# Patient Record
Sex: FEMALE | Race: WHITE | NOT HISPANIC OR LATINO | Employment: FULL TIME | URBAN - METROPOLITAN AREA
[De-identification: names, ages, dates, MRNs, and addresses within clinical notes are randomized per-mention and may not be internally consistent; named-entity substitution may affect disease eponyms.]

---

## 2020-12-09 ENCOUNTER — OFFICE VISIT CONVERTED (OUTPATIENT)
Dept: FAMILY MEDICINE CLINIC | Facility: CLINIC | Age: 40
End: 2020-12-09
Attending: NURSE PRACTITIONER

## 2020-12-09 ENCOUNTER — CONVERSION ENCOUNTER (OUTPATIENT)
Dept: FAMILY MEDICINE CLINIC | Facility: CLINIC | Age: 40
End: 2020-12-09

## 2020-12-09 ENCOUNTER — HOSPITAL ENCOUNTER (OUTPATIENT)
Dept: FAMILY MEDICINE CLINIC | Facility: CLINIC | Age: 40
Discharge: HOME | End: 2020-12-09
Attending: NURSE PRACTITIONER

## 2020-12-22 ENCOUNTER — HOSPITAL ENCOUNTER (OUTPATIENT)
Dept: LAB | Facility: HOSPITAL | Age: 40
Discharge: HOME | End: 2020-12-22
Attending: NURSE PRACTITIONER

## 2020-12-22 ENCOUNTER — HOSPITAL ENCOUNTER (OUTPATIENT)
Dept: GENERAL RADIOLOGY | Facility: HOSPITAL | Age: 40
Discharge: HOME | End: 2020-12-22
Attending: NURSE PRACTITIONER

## 2020-12-22 ENCOUNTER — OFFICE VISIT CONVERTED (OUTPATIENT)
Dept: FAMILY MEDICINE CLINIC | Facility: CLINIC | Age: 40
End: 2020-12-22
Attending: NURSE PRACTITIONER

## 2020-12-22 LAB
ALBUMIN SERPL-MCNC: 4 G/DL (ref 3.5–5)
ALBUMIN/GLOB SERPL: 1.1 {RATIO} (ref 1.4–2.6)
ALP SERPL-CCNC: 132 U/L (ref 42–98)
ALT SERPL-CCNC: 15 U/L (ref 10–40)
ANION GAP SERPL CALC-SCNC: 14 MMOL/L (ref 8–19)
AST SERPL-CCNC: 24 U/L (ref 15–50)
BASOPHILS # BLD AUTO: 0.14 10*3/UL (ref 0–0.2)
BASOPHILS NFR BLD AUTO: 1.2 % (ref 0–3)
BILIRUB SERPL-MCNC: 0.24 MG/DL (ref 0.2–1.3)
BUN SERPL-MCNC: 6 MG/DL (ref 5–25)
BUN/CREAT SERPL: 8 {RATIO} (ref 6–20)
CALCIUM SERPL-MCNC: 8.9 MG/DL (ref 8.7–10.4)
CHLORIDE SERPL-SCNC: 105 MMOL/L (ref 99–111)
CHOLEST SERPL-MCNC: 143 MG/DL (ref 107–200)
CHOLEST/HDLC SERPL: 3.6 {RATIO} (ref 3–6)
CONV ABS IMM GRAN: 0.05 10*3/UL (ref 0–0.2)
CONV CO2: 23 MMOL/L (ref 22–32)
CONV IMMATURE GRAN: 0.4 % (ref 0–1.8)
CONV TOTAL PROTEIN: 7.5 G/DL (ref 6.3–8.2)
CREAT UR-MCNC: 0.72 MG/DL (ref 0.5–0.9)
DEPRECATED RDW RBC AUTO: 46.6 FL (ref 36.4–46.3)
EOSINOPHIL # BLD AUTO: 0.43 10*3/UL (ref 0–0.7)
EOSINOPHIL # BLD AUTO: 3.7 % (ref 0–7)
ERYTHROCYTE [DISTWIDTH] IN BLOOD BY AUTOMATED COUNT: 13.9 % (ref 11.7–14.4)
GFR SERPLBLD BASED ON 1.73 SQ M-ARVRAT: >60 ML/MIN/{1.73_M2}
GLOBULIN UR ELPH-MCNC: 3.5 G/DL (ref 2–3.5)
GLUCOSE SERPL-MCNC: 78 MG/DL (ref 65–99)
HCT VFR BLD AUTO: 36.1 % (ref 37–47)
HDLC SERPL-MCNC: 40 MG/DL (ref 40–60)
HGB BLD-MCNC: 11.4 G/DL (ref 12–16)
LDLC SERPL CALC-MCNC: 89 MG/DL (ref 70–100)
LYMPHOCYTES # BLD AUTO: 2.22 10*3/UL (ref 1–5)
LYMPHOCYTES NFR BLD AUTO: 18.8 % (ref 20–45)
MCH RBC QN AUTO: 28.9 PG (ref 27–31)
MCHC RBC AUTO-ENTMCNC: 31.6 G/DL (ref 33–37)
MCV RBC AUTO: 91.6 FL (ref 81–99)
MONOCYTES # BLD AUTO: 0.94 10*3/UL (ref 0.2–1.2)
MONOCYTES NFR BLD AUTO: 8 % (ref 3–10)
NEUTROPHILS # BLD AUTO: 8 10*3/UL (ref 2–8)
NEUTROPHILS NFR BLD AUTO: 67.9 % (ref 30–85)
NRBC CBCN: 0 % (ref 0–0.7)
OSMOLALITY SERPL CALC.SUM OF ELEC: 282 MOSM/KG (ref 273–304)
PLATELET # BLD AUTO: 378 10*3/UL (ref 130–400)
PMV BLD AUTO: 11.2 FL (ref 9.4–12.3)
POTASSIUM SERPL-SCNC: 3.5 MMOL/L (ref 3.5–5.3)
RBC # BLD AUTO: 3.94 10*6/UL (ref 4.2–5.4)
SODIUM SERPL-SCNC: 138 MMOL/L (ref 135–147)
TRIGL SERPL-MCNC: 69 MG/DL (ref 40–150)
TSH SERPL-ACNC: 2.25 M[IU]/L (ref 0.27–4.2)
VLDLC SERPL-MCNC: 14 MG/DL (ref 5–37)
WBC # BLD AUTO: 11.78 10*3/UL (ref 4.8–10.8)

## 2020-12-23 ENCOUNTER — HOSPITAL ENCOUNTER (OUTPATIENT)
Dept: FAMILY MEDICINE CLINIC | Facility: CLINIC | Age: 40
Discharge: HOME | End: 2020-12-23
Attending: NURSE PRACTITIONER

## 2020-12-28 LAB
CONV LAST MENSTURAL PERIOD: NORMAL
SPECIMEN SOURCE: NORMAL
SPECIMEN SOURCE: NORMAL
THIN PREP CVX: NORMAL

## 2021-01-13 LAB — FUNGUS CSF CULT: NORMAL

## 2021-01-19 ENCOUNTER — OFFICE VISIT CONVERTED (OUTPATIENT)
Dept: FAMILY MEDICINE CLINIC | Facility: CLINIC | Age: 41
End: 2021-01-19
Attending: NURSE PRACTITIONER

## 2021-01-20 ENCOUNTER — OFFICE VISIT CONVERTED (OUTPATIENT)
Dept: PODIATRY | Facility: CLINIC | Age: 41
End: 2021-01-20
Attending: PODIATRIST

## 2021-03-09 ENCOUNTER — OFFICE VISIT CONVERTED (OUTPATIENT)
Dept: PODIATRY | Facility: CLINIC | Age: 41
End: 2021-03-09
Attending: PODIATRIST

## 2021-03-09 ENCOUNTER — CONVERSION ENCOUNTER (OUTPATIENT)
Dept: PODIATRY | Facility: CLINIC | Age: 41
End: 2021-03-09

## 2021-03-24 ENCOUNTER — OFFICE VISIT CONVERTED (OUTPATIENT)
Dept: PODIATRY | Facility: CLINIC | Age: 41
End: 2021-03-24
Attending: PODIATRIST

## 2021-04-28 ENCOUNTER — HOSPITAL ENCOUNTER (OUTPATIENT)
Dept: PHYSICAL THERAPY | Facility: CLINIC | Age: 41
Setting detail: RECURRING SERIES
Discharge: HOME | End: 2021-05-19
Attending: NURSE PRACTITIONER

## 2021-05-10 NOTE — H&P
History and Physical      Patient Name: Sandra Dhillon   Patient ID: [id number]   Sex: Female   YOB: 1980    Primary Care Provider: Veronica VALENTINE   Referring Provider: Veronica VALENTINE    Visit Date: January 20, 2021    Provider: Omar Cardoso DPM   Location: [facility]   Location Address: [address] [place] [address]  [id number]   Location Phone: [phone]          Chief Complaint  · Right Foot Pain  · MTPJ      History Of Present Illness  Sandra Dhillon is a 40 year old /White female who presents to the [facility] today new patient referred from Veronica VALENTINE.      New, Established, New Problem:  new  Location:  Right 1st metatarsal phalangeal joint  Duration:  one month  Onset:  insidious, started after she started working at [employer]  Nature:  sore, achy, numbness  Stable, worsening, improving: improving   Aggravating factors:   Patient relates pain is aggravated by shoe gear and ambulation.     Previous Treatment:  PO steroids, NSAIDs.    Patient denies any fevers, chills, nausea, vomiting, shortness of breathe, nor any other constitutional signs nor symptoms.             Past Medical History  Anxiety; Arthritis; Cervical cancer screening; Depression; Foot pain, right; Forgetfulness; Heel pain; Hemorrhoids; Hidradenitis suppurativa; Ingrown toenail; Migraines; Numbness in feet; Screening for breast cancer         Past Surgical History  Arm Surgery; Excision, sweat glands, inguinal region         Medication List  sertraline 50 mg oral tablet; Topamax 25 mg oral tablet         Allergy List  NO KNOWN DRUG ALLERGIES       Allergies Reconciled  Family Medical History  Cancer, Unspecified; FH: diabetes mellitus         Social History  Alcohol (Never); Tobacco (Current every day)         Immunizations  Name Date Admin   Influenza 12/09/2020         Review of Systems  · Constitutional  o Denies  o : fatigue, night  "sweats  · Eyes  o Denies  o : double vision, blurred vision  · HENT  o Denies  o : vertigo, recent head injury  · Cardiovascular  o Denies  o : chest pain, irregular heart beats  · Respiratory  o Denies  o : shortness of breath, productive cough  · Gastrointestinal  o Denies  o : nausea, vomiting  · Genitourinary  o Denies  o : dysuria, urinary retention  · Integument  o Denies  o : hair growth change, new skin lesions  · Neurologic  o Denies  o : altered mental status, seizures  · Musculoskeletal  o * See HPI  · Endocrine  o Denies  o : cold intolerance, heat intolerance  · Heme-Lymph  o Denies  o : petechiae, lymph node enlargement or tenderness  · Allergic-Immunologic  o Denies  o : frequent illnesses      Vitals  Date Time BP Position Site L\R Cuff Size HR RR TEMP (F) WT  HT  BMI kg/m2 BSA m2 O2 Sat FR L/min FiO2 HC       01/20/2021 08:59 /63 Sitting    70 - R  97.3 194lbs 16oz 5'  3\" 34.54 1.98 100 %            Physical Examination  · Constitutional  o Appearance  o : well developed, well-nourished, no obvious deformities present  · Cardiovascular  o Peripheral Vascular System  o :   § Pedal Pulses  § : pulses 2 + and symmetrical  § Extremities  § : no edema in lower extremities  · Musculoskeletal  o General  o :   § General Musculoskeletal  § : Lower extremity muscle and strength and range of motion is equal and symmetrical bilaterally. The knees are noted to be normal in alignment. Right Medial deviation of the first metatarsal with associated lateral deviation of the hallux at the metatarsal phalangeal joint. Positive tenderness to palpation. No signs of edema, erythema, lymphangitis, nor signs of infection.   · Skin and Subcutaneous Tissue  o General Inspection  o : Skin is noted to have normal texture and turgor, with no excrescences noted.   o Digits and Nails  o : The toenails are noted to be without disese.  · Neurologic  o Sensation  o : Epicritic sensations intact bilaterally.     Dr. Cardoso " reviewed radiographs and results from [facility] and discussed them with the patient.  These are significant for Right Medial deviation of the first metatarsal with associated lateral deviation of the hallux at the metatarsal phalangeal joint with mild Degenerative joint disease.  No periosteal reactions nor osteolytic changes seen.  No occult fractures seen.           Assessment  · Foot pain, right     729.5/M79.671  · Primary osteoarthritis, right ankle and foot     715.17/M19.071  · Hallux abducto valgus, right     735.0/M20.11      Plan  · Medications  o Voltaren 1 % topical gel   SIG: apply to affected area(s) by topical route 4 times a day for 30 days   DISP: (5) Tube with 3 refills  Prescribed on 01/20/2021     o Medications have been Reconciled  o Transition of Care or Provider Policy  · Instructions  o I have discussed the findings of this evaluation with the patient. The discussion included a complete verbal explanation of any changes in the examination results, diagnosis, and the current treatment plan. A schedule for future care needs was explained. If any questions should arise after returning home, I have encouraged the patient to feel free to contact Dr. Cardoso. The patient states understanding and agreement with this plan.  o Pt to monitor for problems and to contact Dr. Cardoso for follow-up should such signs occur. Patient states understanding and agreement with this plan.   o Overview: Osteoarthritis occurs when progressive damage to articular cartilage is caused by multiple factors that are comprised of joint integrity, genetics, local inflammation, mechanical forces, and cellular and biochemical processes. It is the most common joint disorder and leading cause of disability for patients over the age of 65. Males and females are equally affected and genetics play a role in up to 65% of cases. Osteoarthritis can be categorized as either inflammatory or noninflammatory based on  presentation. Patients typically complain of joint pain and stiffness. Joint tenderness, crepitus or joint effusion may be present. The objective of therapy is to control pain, decrease swelling, reduce disability and enhance quality of life.  o Pharmacological Treatment: This patient is having an acute flare of osteoarthritis and intra-articular injection of a corticosteroid has been administered. Patient is advised that intra-articular corticosteroid injections are not to exceed more than 3 per year.   o Follow Up PRN.  o Discussed proper shoegear for the patient's feet and medical condition.  o Rice Therapy: It is important to treat any injury as soon as possible to help control swelling and increase recovery time. The recognized regimen for immediate treatment of sport injuries includes rest, ice (cold application), compression, and elevation (RICE). Remove the injured athlete from play, apply ice to the affected area, wrap or compress the injured area with an elastic bandage when appropriate, and elevate the injured area above heart level to reduce swelling. The patient is to not use ice for longer than 20 minutes at a time, with at least 20 minutes of no ice usage between applications.   o Recommend OTC silicon padding.  o Electronically Identified Patient Education Materials Provided Electronically  · Disposition  o Call or Return if symptoms worsen or persist.            Electronically Signed by: Omar Cardoso DPM -Author on January 20, 2021 09:34:30 AM

## 2021-05-10 NOTE — H&P
History and Physical      Patient Name: Sandra Dhillon   Patient ID: [id number]   Sex: Female   YOB: 1980    Primary Care Provider: Veronica VALENTINE   Referring Provider: Veronica VALENTINE    Visit Date: December 9, 2020    Provider: MEME Child   Location: [facility]   Location Address: [address] [place] [address], [address]  [place], [place]  [id number]   Location Phone: [phone]          Chief Complaint  · new patient/establish care      History Of Present Illness  Sandra Dhillon is a 40 year old /White female who presents for evaluation and treatment of:      Patient is here today to establish care with a new provider. Patient's previous PCP was Dorothy VALENTINE.     Patient has some complaints of reoccuring migraines. sensitivity to light and sound.  Patient has previously taken medication for them, but is no longer on anything. was taking Topamax, worked well, stopped while pregnant then had no further migraines for a couple years, now they are back.  Patient states it has been going on for 6 months or longer. Patient doesn't currently take any medications.     Patient also has some complaints of a possible toe fungus in both feet, mostly in the first toe, present for a year or so.     Patient's last mammo was in 2019.    Patient's last pap was in 2017.     Patient has a hx of anxiety, depression, arthritis, and migraines.    Patient's PHQ9 score was 14 during today's visit. Patient denies any SI/HI. pt states has Hidradenitis suppurativa and has not been bale to get it under control, this contributes greatly to the depression. states at times is very painful and limits activities,. has been to derm in the past, Mayte Phipps, was on Humira but saw little improvement. has never tried Accutane, has also tried antibiotics and spironolactone with minimal improvement.     has never been on anything for depression, states thinks she would like to try  "something now.    pt reports hx spinal mennigitis in early 20's.       Past Medical History  Disease Name Date Onset Notes   Anxiety --  --    Arthritis --  --    Cervical cancer screening 2017 --    Depression --  --    Forgetfulness --  --    Hemorrhoids --  --    Hidradenitis suppurativa --  --    Migraines --  --    Screening for breast cancer 2019 --          Past Surgical History  Procedure Name Date Notes   Excision, sweat glands, inguinal region --  --        Allergies Reconciled  Family Medical History  Disease Name Relative/Age Notes   Cancer, Unspecified Grandfather (maternal)/  Mother/   thyroid Pancreatic         Social History  Finding Status Start/Stop Quantity Notes   Alcohol Never --/-- --  --    Tobacco Current every day --/-- 1 PPD --          Review of Systems  · Constitutional  o Admits  o : fatigue   · Eyes  o Denies  o : blurred vision, changes in vision  · HENT  o Admits  o : headaches   · Cardiovascular  o Denies  o : chest pain, irregular heart beats, rapid heart rate, dyspnea on exertion  · Respiratory  o Denies  o : shortness of breath, wheezing, cough  · Gastrointestinal  o Denies  o : nausea, vomiting, diarrhea, constipation, abdominal pain, blood in stools, melena  · Genitourinary  o Denies  o : frequency, dysuria, hematuria  · Integument  o Denies  o : rash, new skin lesions  · Musculoskeletal  o Denies  o : joint pain, joint swelling, muscle pain  · Endocrine  o Denies  o : polyuria, polydipsia      Vitals  Date Time BP Position Site L\R Cuff Size HR RR TEMP (F) WT  HT  BMI kg/m2 BSA m2 O2 Sat FR L/min FiO2 HC       12/09/2020 08:14 /85 Sitting    125 - R 18  198lbs 2oz 5'  3\" 35.1 2 98 %  21%           pulse re-check 88       Physical Examination  · Constitutional  o Appearance  o : well developed, well-nourished, no acute distress  · Head and Face  o Head  o : normocephalic, atraumatic  · Ears, Nose, Mouth and Throat  o Ears  o :   § External Ears  § : external auditory canal " appearance normal, no discharge present  § Otoscopic Examination  § : tympanic membranes pearly white/gray bilaterally  o Nose  o :   § External Nose  § : no lesions noted  § Nasopharynx  § : no discharge present  o Oral Cavity  o :   § Oral Mucosa  § : oral mucosa light pink  o Throat  o :   § Oropharynx  § : tonsils without exudate, no palatal petechiae  · Neck  o Inspection/Palpation  o : normal appearance, no masses or tenderness, trachea midline  o Thyroid  o : gland size normal, nontender, no nodules or masses present on palpation  · Respiratory  o Respiratory Effort  o : breathing unlabored  o Inspection of Chest  o : chest rise symmetric bilaterally  o Auscultation of Lungs  o : clear to auscultation bilaterally throughout inspiration and expiration  · Cardiovascular  o Heart  o :   § Auscultation of Heart  § : regular rate and rhythm, no murmurs, gallops or rubs  o Peripheral Vascular System  o :   § Extremities  § : no edema  · Lymphatic  o Neck  o : no cervical lymphadenopathy, no supraclavicular lymphadenopathy  · Psychiatric  o Mood and Affect  o : mood normal, affect appropriate     bilat great toenail slightly thicken with yellow/crusty appearance           Assessment  · Screening for depression     V79.0/Z13.89  · Need for influenza vaccination     V04.81/Z23  · Visit for screening mammogram     V76.12/Z12.31  · Depression     311/F32.9  · Fatigue     780.79/R53.83  · Migraines     346.90/G43.909  trial re-start Topamax   · Hidradenitis suppurativa     705.83/L73.2  axilla and groin   · Toenail fungus     110.1/B35.1  fungal nail culture sent for confirmation- if labs OK and culture confirms dx, will tx with Lamisil   · Establishing care with new doctor, encounter for     V65.8/Z76.89      Plan  · Orders  o Annual depression screening, 15 minutes (51276, ) - V79.0/Z13.89 - 12/09/2020  o Immunization Admin Fee (Single) ([facility]) (70472) - V04.81/Z23 - 12/09/2020  o Fluzone Quadrivalent Vaccine, age 6  months + (22347) - V04.81/Z23 - 12/09/2020   Vaccine - Influenza; Dose: 0.5; Site: Left Deltoid; Route: Intramuscular; Date: 12/09/2020 08:29:00; Exp: 06/30/2021; Lot: [id number]; Mfg: [employer]; TradeName: Fluzone Quadrivalent; Administered By: America Nash MA; Comment: Patient tolerated well  o Screening Mammography; Bilateral 3D (09645, 46803, ) - V76.12/Z12.31 - 12/09/2020  o Female Fatigue Panel (CBC, CMP, TSH, B12) [facility] (93384, 49211, 25012, 76708) - 780.79/R53.83 - 12/09/2020  o ACO-17: Screened for tobacco use AND received tobacco cessation intervention (4004F) - - 12/09/2020  o ACO-39: Current medications updated and reviewed (, 1159F) - - 12/09/2020  o ACO-18: Positive screen for clinical depression using a standardized tool and a follow-up plan documented () - - 12/09/2020  o ACO-14: Influenza immunization administered or previously received [facility] () - - 12/09/2020  o DERMATOLOGY CONSULTATION (DERMA) - 705.83/L73.2 - 12/09/2020   pt wanting to try Accutane   o Fungal Culture Nail [facility] (53368) - 110.1/B35.1 - 12/09/2020  · Medications  o Zoloft 25 mg oral tablet   SIG: take 1 tablet (25 mg) by oral route once daily   DISP: (30) Tablet with 1 refills  Prescribed on 12/09/2020     o Topamax 25 mg oral tablet   SIG: take 1 tab by oral route once daily at bedtime for 2 weeks, then increase to 2 tabs QD thereafter   DISP: (60) Tablet with 1 refills  Prescribed on 12/09/2020     o Medications have been Reconciled  o Transition of Care or Provider Policy  · Instructions  o Depression Screen completed and scanned into the EMR under the designated folder within the patient's documents.  o The provider screening met the required time of 15 minutes.  o Patient was given an SSRI/SSNRI medication and warned of possible side effects of the medication including potential for increased risk of suicidal thoughts and feelings. Patient was instructed that if they begin to exhibit any of these effects  "they will discontinue the medication immediately and contact our office or the ER ASAP.  o Patient agrees to a \"No Self Harm\" contract. Patient will either call us, 1, ER, [facility], [facility].  o Take all medications as prescribed/directed.  o Patient was educated/instructed on their diagnosis, treatment and medications prior to discharge from the clinic today.  o Patient instructed to seek medical attention urgently for new or worsening symptoms.  o Call the office with any concerns or questions.  o Chronic conditions reviewed and taken into consideration for today's treatment plan.  o Discussed Covid-19 precautions including, but not limited to, social distancing, avoid touching your face, and hand washing.      pt to sched PAP at her convenience, 6 week f/u on meds, SE and admin meds discussed             Electronically Signed by: MEME Child -Author on December 9, 2020 11:32:28 AM  "

## 2021-05-11 ENCOUNTER — OFFICE VISIT CONVERTED (OUTPATIENT)
Dept: FAMILY MEDICINE CLINIC | Facility: CLINIC | Age: 41
End: 2021-05-11
Attending: NURSE PRACTITIONER

## 2021-05-14 VITALS
OXYGEN SATURATION: 100 % | DIASTOLIC BLOOD PRESSURE: 68 MMHG | WEIGHT: 184 LBS | HEART RATE: 71 BPM | BODY MASS INDEX: 32.6 KG/M2 | SYSTOLIC BLOOD PRESSURE: 106 MMHG | HEIGHT: 63 IN | TEMPERATURE: 97.5 F

## 2021-05-14 VITALS
DIASTOLIC BLOOD PRESSURE: 82 MMHG | BODY MASS INDEX: 34.22 KG/M2 | RESPIRATION RATE: 18 BRPM | WEIGHT: 193.12 LBS | HEIGHT: 63 IN | SYSTOLIC BLOOD PRESSURE: 115 MMHG | HEART RATE: 88 BPM | OXYGEN SATURATION: 100 %

## 2021-05-14 VITALS
RESPIRATION RATE: 18 BRPM | SYSTOLIC BLOOD PRESSURE: 100 MMHG | WEIGHT: 198.12 LBS | HEIGHT: 63 IN | DIASTOLIC BLOOD PRESSURE: 85 MMHG | OXYGEN SATURATION: 98 % | BODY MASS INDEX: 35.11 KG/M2 | HEART RATE: 125 BPM

## 2021-05-14 VITALS
DIASTOLIC BLOOD PRESSURE: 63 MMHG | TEMPERATURE: 97.3 F | BODY MASS INDEX: 34.55 KG/M2 | HEART RATE: 70 BPM | WEIGHT: 195 LBS | HEIGHT: 63 IN | OXYGEN SATURATION: 100 % | SYSTOLIC BLOOD PRESSURE: 116 MMHG

## 2021-05-14 VITALS
BODY MASS INDEX: 31.71 KG/M2 | SYSTOLIC BLOOD PRESSURE: 102 MMHG | HEIGHT: 63 IN | HEART RATE: 75 BPM | OXYGEN SATURATION: 100 % | TEMPERATURE: 97.5 F | WEIGHT: 179 LBS | DIASTOLIC BLOOD PRESSURE: 71 MMHG

## 2021-05-14 VITALS
DIASTOLIC BLOOD PRESSURE: 58 MMHG | SYSTOLIC BLOOD PRESSURE: 110 MMHG | HEART RATE: 72 BPM | OXYGEN SATURATION: 99 % | WEIGHT: 196.25 LBS

## 2021-05-14 NOTE — PROGRESS NOTES
Progress Note      Patient Name: Sandra Dhillon   Patient ID: [id number]   Sex: Female   YOB: 1980    Primary Care Provider: Veronica VALENTINE   Referring Provider: Veronica VALENTINE    Visit Date: March 24, 2021    Provider: Omar Cardoso DPM   Location: [facility]   Location Address: [address] [place] [address]  [id number]   Location Phone: [phone]          Chief Complaint  · Right Foot Pain  · MTPJ      History Of Present Illness  Sandra Dhillon is a 40 year old /White female who presents to the [facility] today a follow up for:      New, Established, New Problem:  est  Location:  Right 1st metatarsal phalangeal joint  Duration:  one month  Onset:  insidious, started after she started working at [employer]  Nature:  sore, achy, numbness  Stable, worsening, improving: initiially improved but it worsening  Aggravating factors:   Patient relates pain is aggravated by shoe gear and ambulation.     Previous Treatment:  PO steroids, NSAIDs, Medrol colt    Patient denies any fevers, chills, nausea, vomiting, shortness of breathe, nor any other constitutional signs nor symptoms.    Patient relates no medical changes since their last visit.           Past Medical History  Anxiety; Arthritis; Cervical cancer screening; Depression; Foot pain, right; Forgetfulness; Heel pain; Hemorrhoids; Hidradenitis suppurativa; Ingrown toenail; Migraines; Numbness in feet; Screening for breast cancer         Past Surgical History  Arm Surgery; Excision, sweat glands, inguinal region         Medication List  diclofenac sodium 75 mg oral tablet,delayed release (DR/EC); sertraline 50 mg oral tablet; Topamax 25 mg oral tablet; Voltaren 1 % topical gel         Allergy List  NO KNOWN DRUG ALLERGIES       Allergies Reconciled  Family Medical History  Cancer, Unspecified; FH: diabetes mellitus         Social History  Alcohol (Never); Tobacco (Current every day)  "        Immunizations  Name Date Admin   Influenza 12/09/2020         Review of Systems  · Constitutional  o Denies  o : fatigue, night sweats  · Eyes  o Denies  o : double vision, blurred vision  · HENT  o Denies  o : vertigo, recent head injury  · Cardiovascular  o Denies  o : chest pain, irregular heart beats  · Respiratory  o Denies  o : shortness of breath, productive cough  · Gastrointestinal  o Denies  o : nausea, vomiting  · Genitourinary  o Denies  o : dysuria, urinary retention  · Integument  o Denies  o : hair growth change, new skin lesions  · Neurologic  o Denies  o : altered mental status, seizures  · Musculoskeletal  o * See HPI  · Endocrine  o Denies  o : cold intolerance, heat intolerance  · Heme-Lymph  o Denies  o : petechiae, lymph node enlargement or tenderness  · Allergic-Immunologic  o Denies  o : frequent illnesses      Vitals  Date Time BP Position Site L\R Cuff Size HR RR TEMP (F) WT  HT  BMI kg/m2 BSA m2 O2 Sat FR L/min FiO2 HC       03/24/2021 09:58 /71 Sitting    75 - R  97.5 179lbs 0oz 5'  3\" 31.71 1.9 100 %            Physical Examination  · Constitutional  o Appearance  o : well developed, well-nourished, no obvious deformities present  · Cardiovascular  o Peripheral Vascular System  o :   § Pedal Pulses  § : pulses 2 + and symmetrical  § Extremities  § : no edema in lower extremities  · Musculoskeletal  o General  o :   § General Musculoskeletal  § : Lower extremity muscle and strength and range of motion is equal and symmetrical bilaterally. The knees are noted to be normal in alignment. Right Medial deviation of the first metatarsal with associated lateral deviation of the hallux at the metatarsal phalangeal joint. Positive tenderness to palpation. No signs of edema, erythema, lymphangitis, nor signs of infection.   · Skin and Subcutaneous Tissue  o General Inspection  o : Skin is noted to have normal texture and turgor, with no excrescences noted.   o Digits and Nails  o : " The toenails are noted to be without disese.  · Neurologic  o Sensation  o : Epicritic sensations intact bilaterally.  · Injection Note/Aspiration Note  o Site  o : Right 1st metatarsal phalangeal joint  o Procedure  o : Procedure: After educating the patient, patient gave consent for the procedure. After using betadine prep x 3, injection was given. The patient tolerated the procedure well.   o Medication  o : 1.0ml of 1% lidocaine plain and 1.0ml of 4mg/ml Dexamethasone   o Disposition  o : The patient tolerated the procedure well          Assessment  · Foot pain, right     729.5/M79.671  · Primary osteoarthritis, right ankle and foot     715.17/M19.071  · Hallux abducto valgus, right     735.0/M20.11      Plan  · Orders  o Decadron 4 mg/1cc Injection () - 729.5/M79.671, 715.17/M19.071, 735.0/M20.11 - 03/24/2021   Injection - Dexamethasone 4mg/mL; Dose: 2 mg; Site: Not Entered; Route: intra-articular; Date: 03/24/2021 10:26:47; Exp: 11/30/2022; Lot: [id number]; Mfg: [employer]; TradeName: dexamethasone sodium phosphate; [facility]; Administered By: Omar Cardoso DPM; Comment: [id number] Injetion site right great toe  o Injection Intermid Joint (06902) - 729.5/M79.671, 715.17/M19.071, 735.0/M20.11 - 03/24/2021  · Medications  o Medications have been Reconciled  o Transition of Care or Provider Policy  · Instructions  o I have discussed the findings of this evaluation with the patient. The discussion included a complete verbal explanation of any changes in the examination results, diagnosis, and the current treatment plan. A schedule for future care needs was explained. If any questions should arise after returning home, I have encouraged the patient to feel free to contact Dr. Cardoso. The patient states understanding and agreement with this plan.  o Pt to monitor for problems and to contact Dr. Cradoso for follow-up should such signs occur. Patient states understanding and agreement  with this plan.   o Overview: Osteoarthritis occurs when progressive damage to articular cartilage is caused by multiple factors that are comprised of joint integrity, genetics, local inflammation, mechanical forces, and cellular and biochemical processes. It is the most common joint disorder and leading cause of disability for patients over the age of 65. Males and females are equally affected and genetics play a role in up to 65% of cases. Osteoarthritis can be categorized as either inflammatory or noninflammatory based on presentation. Patients typically complain of joint pain and stiffness. Joint tenderness, crepitus or joint effusion may be present. The objective of therapy is to control pain, decrease swelling, reduce disability and enhance quality of life.  o Pharmacological Treatment: This patient is having an acute flare of osteoarthritis and intra-articular injection of a corticosteroid has been administered. Patient is advised that intra-articular corticosteroid injections are not to exceed more than 3 per year.   o Discussed proper shoegear for the patient's feet and medical condition.  o Rice Therapy: It is important to treat any injury as soon as possible to help control swelling and increase recovery time. The recognized regimen for immediate treatment of sport injuries includes rest, ice (cold application), compression, and elevation (RICE). Remove the injured athlete from play, apply ice to the affected area, wrap or compress the injured area with an elastic bandage when appropriate, and elevate the injured area above heart level to reduce swelling. The patient is to not use ice for longer than 20 minutes at a time, with at least 20 minutes of no ice usage between applications.   o Patient request PRN follow-up.  o Electronically Identified Patient Education Materials Provided Electronically  · Disposition  o Call or Return if symptoms worsen or persist.            Electronically Signed by: Omar  JESSICA Cardoso -Author on March 24, 2021 02:19:27 PM

## 2021-05-14 NOTE — PROGRESS NOTES
Progress Note      Patient Name: Sandra Dhillon   Patient ID: [id number]   Sex: Female   YOB: 1980    Primary Care Provider: Veronica VALENTINE   Referring Provider: Veronica VALENTINE    Visit Date: December 22, 2020    Provider: MEME Child   Location: [facility]   Location Address: [address] [place] [address], [address]  [place], [place]  [id number]   Location Phone: [phone]          Chief Complaint  · CPE/WWE      History Of Present Illness  Sandra Dhillon is a 40 year old /White female who presents for evaluation and treatment of:      Patient is here today for an annual papsmear. Patient wants to discuss foot pain, right foot worse than left- new since starting work at [employer], states at times right foot actually gets numb feeling. states in tears sometimes by end of day. states is wearing recommended supportive shoes. also c/o LBP with radiation in to right hip. on and of for a while.     Patient has a hx of migraines, depression, and Hidradenitis. Patient is taking Topamax and Zoloft.       Past Medical History  Disease Name Date Onset Notes   Anxiety --  --    Arthritis --  --    Cervical cancer screening 2017 --    Depression --  --    Forgetfulness --  --    Hemorrhoids --  --    Hidradenitis suppurativa --  --    Migraines --  --    Screening for breast cancer 2019 --          Past Surgical History  Procedure Name Date Notes   Excision, sweat glands, inguinal region --  --          Medication List  Name Date Started Instructions   Topamax 25 mg oral tablet 12/09/2020 take 1 tab by oral route once daily at bedtime for 2 weeks, then increase to 2 tabs QD thereafter   Zoloft 25 mg oral tablet 12/09/2020 take 1 tablet (25 mg) by oral route once daily         Family Medical History  Disease Name Relative/Age Notes   Cancer, Unspecified Grandfather (maternal)/  Mother/   thyroid Pancreatic         Social History  Finding Status Start/Stop Quantity Notes  "  Alcohol Never --/-- --  --    Tobacco Current every day --/-- 1 PPD --          Immunizations  NameDate Admin Mfg Trade Name Lot Number Route Inj VIS Given VIS Publication   Qesbrwozn96/09/2020 [facility] Fluzone Quadrivalent [id number] IM LD 12/09/2020 08/15/2019   Comments: Patient tolerated well         Review of Systems  · Constitutional  o Denies  o : fatigue  · Eyes  o Denies  o : blurred vision, changes in vision  · HENT  o Denies  o : headaches  · Cardiovascular  o Denies  o : chest pain, irregular heart beats, rapid heart rate, dyspnea on exertion  · Respiratory  o Denies  o : shortness of breath, wheezing, cough  · Gastrointestinal  o Denies  o : nausea, vomiting, diarrhea, constipation, abdominal pain, blood in stools, melena  · Genitourinary  o Denies  o : frequency, dysuria, hematuria  · Integument  o Denies  o : rash, new skin lesions  · Musculoskeletal  o Denies  o : joint pain, joint swelling, muscle pain  · Endocrine  o Admits  o : central obesity  o Denies  o : polyuria, polydipsia      Vitals  Date Time BP Position Site L\R Cuff Size HR RR TEMP (F) WT  HT  BMI kg/m2 BSA m2 O2 Sat FR L/min FiO2 HC       12/09/2020 08:14 /85 Sitting    125 - R 18  198lbs 2oz 5'  3\" 35.1 2 98 %  21%    12/22/2020 08:28 /82 Sitting    88 - R 18  193lbs 2oz 5'  3\" 34.21 1.97 100 %  21%          Physical Examination  · Constitutional  o Appearance  o : well-nourished, in no acute distress  · Head and Face  o Head  o : normocephalic, atraumatic  · Ears, Nose, Mouth and Throat  o Ears  o :   § External Ears  § : external auditory canal appearance normal, no discharge present  § Otoscopic Examination  § : tympanic membranes pearly white/gray bilaterally  o Nose  o :   § External Nose  § : no lesions noted  § Nasopharynx  § : no discharge present  o Oral Cavity  o :   § Oral Mucosa  § : oral mucosa light pink  o Throat  o :   § Oropharynx  § : tonsils without exudate, no palatal " petechiae  · Neck  o Inspection/Palpation  o : normal appearance, no masses or tenderness, trachea midline  o Range of Motion  o : cervical range of motion within normal limits  o Thyroid  o : gland size normal, nontender, no nodules or masses present on palpation  · Respiratory  o Respiratory Effort  o : breathing unlabored  o Inspection of Chest  o : normal appearance  o Auscultation of Lungs  o : normal breath sounds throughout  · Cardiovascular  o Heart  o :   § Auscultation of Heart  § : regular rate and rhythm, no murmurs, gallops or rubs  o Peripheral Vascular System  o :   § Carotid Arteries  § : normal pulses bilaterally, no bruits present  § Pedal Pulses  § : pulses 2 bilaterally  § Extremities  § : no edema  · Breasts  o Inspection of Breasts  o : breasts symmetrical, no deformities present, no nipple discharge present- left breast with scarring and erythematous nodules with purulent drainage lateral aspect of breast - pt has extensive HS- states lesions are chronic- denies any changes   o Palpation of Breasts, Axillae  o : no masses present on palpation, no breast tenderness  · Gastrointestinal  o Abdominal Examination  o : abdomen nontender to palpation, tone normal without rigidity or guarding, no masses present, normal bowel sounds  · Genitourinary  o External Genitalia  o : pt with extensive HS   o Vagina  o : normal vaginal vault, no discharge present, no inflammatory lesions present, no masses present  o Urethra  o :   § Urethral Meatus  § : no inflammation present  § Urethral Body  § : urethra palpation normal  o Bladder  o : nontender to palpation  o Cervix  o : cervical Nabothian cysts noted, large polyp noted protruding from cervical os   o Uterus  o : nontender to palpation, no masses present, position midline/midplane  o Adnexa  o : no tenderness or masses present on bimanual examination  o Anus  o : no inflammation or lesions present  o Perineum  o : perineum within normal  limits  · Lymphatic  o Neck  o : no lymphadenopathy present  o Axilla  o : no lymphadenopathy present- extensive scarring from HS and surgical repair   o Groin  o : no lymphadenopathy present  · Neurologic  o Mental Status Examination  o :   § Orientation  § : grossly oriented to person, place and time  o Gait and Station  o : normal gait, able to stand without difficulty  · Psychiatric  o Judgement and Insight  o : judgment and insight intact  o Mood and Affect  o : mood normal, affect appropriate          Assessment  · Screening for cervical cancer     V76.2/Z12.4  · Visit for screening mammogram     V76.12/Z12.31  pt already has mammo sched for April 20th 2021   · Annual physical exam     V70.0/Z00.00  · Lumbago     724.2/M54.5  · Pap Smear     V76.2/Z01.419  · Right foot pain     729.5/M79.671  · Cervical polyp     622.7/N84.1    Problems Reconciled  Plan  · Orders  o Physical, Primary Care Panel (CBC, CMP, Lipid, TSH) [facility] (33309, 27603, 75562, 55441) - V70.0/Z00.00 - 12/22/2020  o Lumbar Spine Complete [facility] (54135) - 724.2/M54.5 - 12/22/2020  o Pap smear (24180) - V76.2/Z01.419 - 12/22/2020  o ACO-39: Current medications updated and reviewed (, 1159F) - - 12/22/2020  o ACO-14: Influenza immunization administered or previously received [facility] () - - 12/22/2020  o Xray foot right [facility] ([id number]) - 729.5/M79.671 - 12/22/2020  o OB/GYN CONSULTATION (OBGYN) - 622.7/N84.1 - 12/22/2020   large cervical polyp   · Medications  o diclofenac sodium 75 mg oral tablet,delayed release (DR/EC)   SIG: take 1 tablet (75 mg) by oral route 2 times per day   DISP: (60) Tablet with 0 refills  Prescribed on 12/22/2020     o prednisone 20 mg oral tablet   SIG: 3 tab PO QD for 3 days, then 2 tab PO QD for 2 days, then 1 tab PO QD for 2 days.   DISP: (15) Tablet with 0 refills  Prescribed on 12/22/2020     · Instructions  o Reviewed health maintenance flowsheet and updated information. Orders were placed and/or  patient's response was documented.  o **Pap Test/Liquid Based:   o Thin Prep  o Source:   o Cervix  o **Perform Reflex Human Papilloma Virus (HPV) High Risk on this Pap (If atypical squamous cells of the undetermined signifigcance (ASCUS)/Atypical Glandular Cells of undetermined significance (AGCUS): Low Grade Squamous Intraepitheal lesion (LGSIL): **  o Yes  o Medicare:  o No  o **Is this an annual PAP:  o Yes  o Take all medications as prescribed/directed.  o Patient was educated/instructed on their diagnosis, treatment and medications prior to discharge from the clinic today.  o Patient instructed to seek medical attention urgently for new or worsening symptoms.  o Call the office with any concerns or questions.  o Chronic conditions reviewed and taken into consideration for today's treatment plan.  o Discussed Covid-19 precautions including, but not limited to, social distancing, avoid touching your face, and hand washing.   · Disposition  o Call or Return if symptoms worsen or persist.  o Follow Up PRN            Electronically Signed by: MEME Child -Author on December 22, 2020 10:06:32 AM

## 2021-05-14 NOTE — PROGRESS NOTES
Progress Note      Patient Name: Sandra Dhillon   Patient ID: [id number]   Sex: Female   YOB: 1980    Primary Care Provider: Veronica VALENTINE   Referring Provider: Veronica VALENTINE    Visit Date: January 19, 2021    Provider: MEME Child   Location: [facility]   Location Address: [address] [place] [address], [address]  [place], [place]  [id number]   Location Phone: [phone]          Chief Complaint  · 6 Week Follow-Up      History Of Present Illness  Sandra Dhillon is a 40 year old /White female who presents for evaluation and treatment of:      Patient is here today regarding a follow up on her medications she was started on.  Patient is taking Zoloft 25 mg (1 Tab PO QD)  and Topamax 25 mg (2 Tab PO QD).    Patient denies any side effects from the Zoloft.     Patient does have a side effect of tingling of her hands and feet from the Topamax, states is tolerable and still wants to continue.     Patient has an appointment with the Podiatrist tomorrow. Patient seeing Physical Therapy for her back and hip tomorrow. Patient sees the OBGYN on February the 10th, 2021.    has not had any migraines since starting Topamax       Past Medical History  Disease Name Date Onset Notes   Anxiety --  --    Arthritis --  --    Cervical cancer screening 2017 --    Depression --  --    Forgetfulness --  --    Hemorrhoids --  --    Hidradenitis suppurativa --  --    Migraines --  --    Screening for breast cancer 2019 --          Past Surgical History  Procedure Name Date Notes   Excision, sweat glands, inguinal region --  --          Medication List  Name Date Started Instructions   diclofenac sodium 75 mg oral tablet,delayed release (DR/EC) 12/22/2020 take 1 tablet (75 mg) by oral route 2 times per day   Topamax 25 mg oral tablet 01/19/2021 take 1 tab by oral route once daily at bedtime for 2 weeks, then increase to 2 tabs QD thereafter         Allergy List  Allergen Name Date  Reaction Notes   NO KNOWN DRUG ALLERGIES --  --  --        Allergies Reconciled  Family Medical History  Disease Name Relative/Age Notes   Cancer, Unspecified Grandfather (maternal)/  Mother/   thyroid Pancreatic         Social History  Finding Status Start/Stop Quantity Notes   Alcohol Never --/-- --  --    Tobacco Current every day --/-- 1 PPD --          Immunizations  NameDate Admin Mfg Trade Name Lot Number Route Inj VIS Given VIS Publication   Soukmgzlk08/09/2020 [facility] Fluzone Quadrivalent [id number] IM LD 12/09/2020 08/15/2019   Comments: Patient tolerated well         Review of Systems  · Constitutional  o Denies  o : fever, fatigue, weight loss, weight gain  · Cardiovascular  o Denies  o : lower extremity edema, claudication, chest pressure, palpitations  · Respiratory  o Denies  o : shortness of breath, wheezing, cough, hemoptysis, dyspnea on exertion  · Gastrointestinal  o Denies  o : nausea, vomiting, diarrhea, constipation, abdominal pain      Vitals  Date Time BP Position Site L\R Cuff Size HR RR TEMP (F) WT  HT  BMI kg/m2 BSA m2 O2 Sat FR L/min FiO2        01/19/2021 09:55 /58 Sitting    72 - R   196lbs 4oz    99 %  21%          Physical Examination  · Constitutional  o Appearance  o : well developed, well-nourished, no acute distress  · Head and Face  o Head  o : normocephalic, atraumatic  · Neck  o Inspection/Palpation  o : normal appearance, no masses or tenderness, trachea midline  o Thyroid  o : gland size normal, nontender, no nodules or masses present on palpation  · Respiratory  o Respiratory Effort  o : breathing unlabored  o Inspection of Chest  o : chest rise symmetric bilaterally  o Auscultation of Lungs  o : clear to auscultation bilaterally throughout inspiration and expiration  · Cardiovascular  o Heart  o :   § Auscultation of Heart  § : regular rate and rhythm, no murmurs, gallops or rubs  o Peripheral Vascular System  o :   § Extremities  § : no edema  · Lymphatic  o Neck  o : no  cervical lymphadenopathy, no supraclavicular lymphadenopathy  · Psychiatric  o Mood and Affect  o : mood normal, affect appropriate          Assessment  · Depression     296.31  · Anxiety     300.02/F41.1  · Migraines     346.90/G43.909      Plan  · Orders  o ACO-39: Current medications updated and reviewed (, 1159F) - - 01/19/2021  · Medications  o sertraline 50 mg oral tablet   SIG: take 1 tablet (50 mg) by oral route once daily   DISP: (30) Tablet with 2 refills  Prescribed on 01/19/2021     o Topamax 25 mg oral tablet   SIG: take 1 tab by oral route once daily at bedtime for 2 weeks, then increase to 2 tabs QD thereafter   DISP: (60) Tablet with 2 refills  Refilled on 01/19/2021     o SERTRALINE 25MG TABLETS   SIG: TAKE 1 TABLET BY MOUTH EVERY DAY   DISP: (30) Tablet with 4 refills  Discontinued on 01/19/2021     o Medications have been Reconciled  o Transition of Care or Provider Policy  · Instructions  o Patient is taking medications as prescribed and doing well.   o Take all medications as prescribed/directed.  o Patient was educated/instructed on their diagnosis, treatment and medications prior to discharge from the clinic today.  o Patient instructed to seek medical attention urgently for new or worsening symptoms.  o Call the office with any concerns or questions.  o Chronic conditions reviewed and taken into consideration for today's treatment plan.  o Discussed Covid-19 precautions including, but not limited to, social distancing, avoid touching your face, and hand washing.   o Electronically Identified Patient Education Materials Provided Electronically  · Disposition  o Call or Return if symptoms worsen or persist.  o Follow Up PRN  o Follow Up in 3 months            Electronically Signed by: MEME Child -Author on January 19, 2021 10:31:05 AM

## 2021-05-14 NOTE — PROGRESS NOTES
Progress Note      Patient Name: Sandra Dhillon   Patient ID: [id number]   Sex: Female   YOB: 1980    Primary Care Provider: Veronica VALENTINE   Referring Provider: Veronica VALENTINE    Visit Date: March 9, 2021    Provider: Omar Cardoso DPM   Location: [facility]   Location Address: [address] [place] [address]  [id number]   Location Phone: [phone]          Chief Complaint  · Right Foot Pain  · MTPJ      History Of Present Illness  Sandra Dhillon is a 40 year old /White female who presents to the [facility] today a follow up for:      New, Established, New Problem:  est  Location:  Right 1st metatarsal phalangeal joint  Duration:  one month  Onset:  insidious, started after she started working at [employer]  Nature:  sore, achy, numbness  Stable, worsening, improving: worsening  Aggravating factors:   Patient relates pain is aggravated by shoe gear and ambulation.     Previous Treatment:  PO steroids, NSAIDs.    Patient denies any fevers, chills, nausea, vomiting, shortness of breathe, nor any other constitutional signs nor symptoms.    Patient relates no medical changes since their last visit.           Past Medical History  Anxiety; Arthritis; Cervical cancer screening; Depression; Foot pain, right; Forgetfulness; Heel pain; Hemorrhoids; Hidradenitis suppurativa; Ingrown toenail; Migraines; Numbness in feet; Screening for breast cancer         Past Surgical History  Arm Surgery; Excision, sweat glands, inguinal region         Medication List  sertraline 50 mg oral tablet; Topamax 25 mg oral tablet; Voltaren 1 % topical gel         Allergy List  NO KNOWN DRUG ALLERGIES         Family Medical History  Cancer, Unspecified; FH: diabetes mellitus         Social History  Alcohol (Never); Tobacco (Current every day)         Immunizations  Name Date Admin   Influenza 12/09/2020         Review of Systems  · Constitutional  o Denies  o : fatigue,  "night sweats  · Eyes  o Denies  o : double vision, blurred vision  · HENT  o Denies  o : vertigo, recent head injury  · Cardiovascular  o Denies  o : chest pain, irregular heart beats  · Respiratory  o Denies  o : shortness of breath, productive cough  · Gastrointestinal  o Denies  o : nausea, vomiting  · Genitourinary  o Denies  o : dysuria, urinary retention  · Integument  o Denies  o : hair growth change, new skin lesions  · Neurologic  o Denies  o : altered mental status, seizures  · Musculoskeletal  o * See HPI  · Endocrine  o Denies  o : cold intolerance, heat intolerance  · Heme-Lymph  o Denies  o : petechiae, lymph node enlargement or tenderness  · Allergic-Immunologic  o Denies  o : frequent illnesses      Vitals  Date Time BP Position Site L\R Cuff Size HR RR TEMP (F) WT  HT  BMI kg/m2 BSA m2 O2 Sat FR L/min FiO2 HC       03/09/2021 11:06 /68 Sitting    71 - R  97.5 184lbs 0oz 5'  3\" 32.59 1.93 100 %            Physical Examination  · Constitutional  o Appearance  o : well developed, well-nourished, no obvious deformities present  · Cardiovascular  o Peripheral Vascular System  o :   § Pedal Pulses  § : pulses 2 + and symmetrical  § Extremities  § : no edema in lower extremities  · Musculoskeletal  o General  o :   § General Musculoskeletal  § : Lower extremity muscle and strength and range of motion is equal and symmetrical bilaterally. The knees are noted to be normal in alignment. Right Medial deviation of the first metatarsal with associated lateral deviation of the hallux at the metatarsal phalangeal joint. Positive tenderness to palpation. No signs of edema, erythema, lymphangitis, nor signs of infection.   · Skin and Subcutaneous Tissue  o General Inspection  o : Skin is noted to have normal texture and turgor, with no excrescences noted.   o Digits and Nails  o : The toenails are noted to be without disese.  · Neurologic  o Sensation  o : Epicritic sensations intact " bilaterally.          Assessment  · Foot pain, right     729.5/M79.671  · Primary osteoarthritis, right ankle and foot     715.17/M19.071  · Hallux abducto valgus, right     735.0/M20.11      Plan  · Medications  o Medrol (Daniel) 4 mg oral tablets,dose pack   SIG: take as directed for 6 days   DISP: (1) Package with 0 refills  Prescribed on 03/09/2021     o diclofenac sodium 75 mg oral tablet,delayed release (DR/EC)   SIG: take 1 tablet (75 mg) by oral route 2 times per day for 30 days   DISP: (60) Tablet with 1 refills  Prescribed on 03/09/2021     o Medications have been Reconciled  o Transition of Care or Provider Policy  · Instructions  o I have discussed the findings of this evaluation with the patient. The discussion included a complete verbal explanation of any changes in the examination results, diagnosis, and the current treatment plan. A schedule for future care needs was explained. If any questions should arise after returning home, I have encouraged the patient to feel free to contact Dr. Cardoso. The patient states understanding and agreement with this plan.  o Pt to monitor for problems and to contact Dr. Cardoso for follow-up should such signs occur. Patient states understanding and agreement with this plan.   o Overview: Osteoarthritis occurs when progressive damage to articular cartilage is caused by multiple factors that are comprised of joint integrity, genetics, local inflammation, mechanical forces, and cellular and biochemical processes. It is the most common joint disorder and leading cause of disability for patients over the age of 65. Males and females are equally affected and genetics play a role in up to 65% of cases. Osteoarthritis can be categorized as either inflammatory or noninflammatory based on presentation. Patients typically complain of joint pain and stiffness. Joint tenderness, crepitus or joint effusion may be present. The objective of therapy is to control pain, decrease  swelling, reduce disability and enhance quality of life.  o Pharmacological Treatment: This patient is having an acute flare of osteoarthritis and intra-articular injection of a corticosteroid has been administered. Patient is advised that intra-articular corticosteroid injections are not to exceed more than 3 per year.   o Follow Up in 2 weeks. Rx f/u.  o Discussed proper shoegear for the patient's feet and medical condition.  o Rice Therapy: It is important to treat any injury as soon as possible to help control swelling and increase recovery time. The recognized regimen for immediate treatment of sport injuries includes rest, ice (cold application), compression, and elevation (RICE). Remove the injured athlete from play, apply ice to the affected area, wrap or compress the injured area with an elastic bandage when appropriate, and elevate the injured area above heart level to reduce swelling. The patient is to not use ice for longer than 20 minutes at a time, with at least 20 minutes of no ice usage between applications.   o Electronically Identified Patient Education Materials Provided Electronically  · Disposition  o Call or Return if symptoms worsen or persist.            Electronically Signed by: Omar Cardoso DPM -Author on March 9, 2021 11:16:08 AM

## 2021-06-05 NOTE — PROGRESS NOTES
"   Progress Note      Patient Name: Sandra Dhillon   Patient ID: [id number]   Sex: Female   YOB: 1980    Primary Care Provider: Veronica VALENTINE   Referring Provider: Veronica VALENTINE    Visit Date: May 11, 2021    Provider: MEME Child   Location: [facility]   Location Address: [address] [place] [address], [address]  [place], [place]  [id number]   Location Phone: [phone]          Chief Complaint  · Follow up new med      History Of Present Illness  Sandra Dhillon is a 40 year old /White female who presents for evaluation and treatment of:      Patient is her today to follow up on the dose change of two of her medicines. patient states that her Sertraline and Topamax were increased.    She states the Topamax increase has done well, she has had no migraines.   She states that the Sertraline increase has not been effective. She said that she is still having a lot of anxiety. She states that she thinks it needs to be increased more. states she feels so irritable and everything seems to make her mad or get on her nerves that previously would not.     also  has not repeated labs that were ordered in January.  would like to get those checked now as she is having a lot of fatigue for the last couple weeks.  is working a lot more, but wants to be sure.     labs showed mild anemia, does have slightly heavy periods \"but not excessive\" denies any blood in stool or dark stool.       Past Medical History  Disease Name Date Onset Notes   Anxiety --  --    Arthritis --  --    Cervical cancer screening 2017 --    Depression --  --    Foot pain, right --  --    Forgetfulness --  --    Heel pain --  --    Hemorrhoids --  --    Hidradenitis suppurativa --  --    Ingrown toenail --  --    Migraines --  --    Numbness in feet --  --    Screening for breast cancer 2019 --          Past Surgical History  Procedure Name Date Notes   Arm Surgery --  --    Excision, " "sweat glands, inguinal region --  --          Medication List  Name Date Started Instructions   diclofenac sodium 75 mg oral tablet,delayed release (DR/EC) 03/09/2021 take 1 tablet (75 mg) by oral route 2 times per day for 30 days   sertraline 50 mg oral tablet 01/19/2021 take 1 tablet (50 mg) by oral route once daily   Topamax 25 mg oral tablet 01/19/2021 take 1 tab by oral route once daily at bedtime for 2 weeks, then increase to 2 tabs QD thereafter   Voltaren 1 % topical gel 01/20/2021 apply to affected area(s) by topical route 4 times a day for 30 days         Allergy List  Allergen Name Date Reaction Notes   NO KNOWN DRUG ALLERGIES --  --  --        Allergies Reconciled  Family Medical History  Disease Name Relative/Age Notes   Cancer, Unspecified Grandfather (maternal)/  Mother/   thyroid Pancreatic   FH: diabetes mellitus Mother/   --          Social History  Finding Status Start/Stop Quantity Notes   Alcohol Never --/-- --  --    Tobacco Current every day --/-- 1 PPD --          Immunizations  NameDate Admin Mfg Trade Name Lot Number Route Inj VIS Given VIS Publication   [id number]/09/2020 [facility] Fluzone Quadrivalent [id number] IM LD 12/09/2020 08/15/2019   Comments: Patient tolerated well         Review of Systems  · Constitutional  o Admits  o : fatigue   o Denies  o : fever, weight loss, weight gain  · Cardiovascular  o Denies  o : lower extremity edema, claudication, chest pressure, palpitations  · Respiratory  o Denies  o : shortness of breath, wheezing, cough, hemoptysis, dyspnea on exertion  · Gastrointestinal  o Denies  o : nausea, vomiting, diarrhea, constipation, abdominal pain      Vitals  Date Time BP Position Site L\R Cuff Size HR RR TEMP (F) WT  HT  BMI kg/m2 BSA m2 O2 Sat FR L/min FiO2 HC       01/20/2021 08:59 /63 Sitting    70 - R  97.3 194lbs 16oz 5'  3\" 34.54 1.98 100 %      03/09/2021 11:06 /68 Sitting    71 - R  97.5 184lbs 0oz 5'  3\" 32.59 1.93 100 %      03/24/2021 09:58 AM " "102/71 Sitting    75 - R  97.5 179lbs 0oz 5'  3\" 31.71 1.9 100 %      05/11/2021 11:58 /69 Sitting    73 - R   178lbs 2oz 5'  3\" 31.55 1.9 100 %  21%          Physical Examination  · Constitutional  o Appearance  o : well developed, well-nourished, no acute distress  · Head and Face  o Head  o : normocephalic, atraumatic  · Ears, Nose, Mouth and Throat  o Ears  o :   § External Ears  § : external auditory canal appearance normal, no discharge present  § Otoscopic Examination  § : tympanic membranes pearly white/gray bilaterally  o Nose  o :   § External Nose  § : no lesions noted  § Nasopharynx  § : no discharge present  o Oral Cavity  o :   § Oral Mucosa  § : oral mucosa light pink  o Throat  o :   § Oropharynx  § : tonsils without exudate, no palatal petechiae  · Neck  o Inspection/Palpation  o : normal appearance, no masses or tenderness, trachea midline  o Thyroid  o : gland size normal, nontender, no nodules or masses present on palpation  · Respiratory  o Respiratory Effort  o : breathing unlabored  o Inspection of Chest  o : chest rise symmetric bilaterally  o Auscultation of Lungs  o : clear to auscultation bilaterally throughout inspiration and expiration  · Cardiovascular  o Heart  o :   § Auscultation of Heart  § : regular rate and rhythm, no murmurs, gallops or rubs  o Peripheral Vascular System  o :   § Extremities  § : no edema  · Lymphatic  o Neck  o : no cervical lymphadenopathy, no supraclavicular lymphadenopathy  · Psychiatric  o Mood and Affect  o : mood normal, affect appropriate          Assessment  · Anemia     285.9/D64.9  · Anxiety disorder     300.00/F41.9  trial switch Zoloft to Effexor. SE and admin discussed   · Fatigue     780.79/R53.83  · Anxiety     300.02/F41.1  · Migraines     346.90/G43.909      Plan  · Orders  o B12 Folate levels (B12FO) - 285.9/D64.9 - 05/11/2021  o CBC with Auto Diff [facility] (33954) - 285.9/D64.9 - 05/11/2021  o Iron panel (iron, TIBC, transferrin saturation) " (61177, 04135, 77051) - 285.9/D64.9 - 05/11/2021  o Ferritin ser/plas (70574) - 285.9/D64.9 - 05/11/2021  o CMP [facility] (76269) - 780.79/R53.83 - 05/11/2021  o Thyroid Profile (THYII, 96724, 86711) - 780.79/R53.83 - 05/11/2021  o ACO-39: Current medications updated and reviewed (1159F, ) - - 05/11/2021  · Medications  o Effexor XR 75 mg oral capsule,extended release 24hr   SIG: take 1 capsule (75 mg) by oral route once daily with food   DISP: (30) Capsule with 2 refills  Prescribed on 05/11/2021     o Topamax 25 mg oral tablet   SIG: take 1 tab by oral route once daily at bedtime for 2 weeks, then increase to 2 tabs QD thereafter   DISP: (60) Tablet with 2 refills  Refilled on 05/11/2021     o sertraline 50 mg oral tablet   SIG: take 1 tablet (50 mg) by oral route once daily   DISP: (30) Tablet with 2 refills  Discontinued on 05/11/2021     o Medications have been Reconciled  o Transition of Care or Provider Policy  · Instructions  o Patient is taking medications as prescribed and doing well.   o Take all medications as prescribed/directed.  o Patient was educated/instructed on their diagnosis, treatment and medications prior to discharge from the clinic today.  o Patient instructed to seek medical attention urgently for new or worsening symptoms.  o Call the office with any concerns or questions.  o Chronic conditions reviewed and taken into consideration for today's treatment plan.  o Electronically Identified Patient Education Materials Provided Electronically  · Disposition  o Call or Return if symptoms worsen or persist.  o Follow Up PRN  o Follow Up in 1 month            Electronically Signed by: MEME Child -Author on May 11, 2021 12:33:14 PM

## 2021-07-13 RX ORDER — DICLOFENAC SODIUM 75 MG/1
TABLET, DELAYED RELEASE ORAL
Qty: 60 TABLET | Refills: 0 | Status: SHIPPED | OUTPATIENT
Start: 2021-07-13 | End: 2021-10-29

## 2021-07-15 VITALS
HEART RATE: 73 BPM | WEIGHT: 178.12 LBS | BODY MASS INDEX: 31.56 KG/M2 | HEIGHT: 63 IN | DIASTOLIC BLOOD PRESSURE: 69 MMHG | OXYGEN SATURATION: 100 % | SYSTOLIC BLOOD PRESSURE: 111 MMHG

## 2021-08-09 RX ORDER — VENLAFAXINE HYDROCHLORIDE 75 MG/1
CAPSULE, EXTENDED RELEASE ORAL
Qty: 30 CAPSULE | Refills: 0 | Status: SHIPPED | OUTPATIENT
Start: 2021-08-09 | End: 2021-09-09

## 2021-09-07 RX ORDER — TOPIRAMATE 25 MG/1
TABLET ORAL
Qty: 60 TABLET | Refills: 0 | Status: SHIPPED | OUTPATIENT
Start: 2021-09-07 | End: 2021-09-09 | Stop reason: SDUPTHER

## 2021-09-09 ENCOUNTER — HOSPITAL ENCOUNTER (OUTPATIENT)
Dept: GENERAL RADIOLOGY | Facility: HOSPITAL | Age: 41
Discharge: HOME | End: 2021-09-09

## 2021-09-09 ENCOUNTER — OFFICE VISIT (OUTPATIENT)
Dept: FAMILY MEDICINE CLINIC | Facility: CLINIC | Age: 41
End: 2021-09-09

## 2021-09-09 ENCOUNTER — TELEPHONE (OUTPATIENT)
Dept: FAMILY MEDICINE CLINIC | Facility: CLINIC | Age: 41
End: 2021-09-09

## 2021-09-09 ENCOUNTER — LAB (OUTPATIENT)
Dept: LAB | Facility: HOSPITAL | Age: 41
End: 2021-09-09

## 2021-09-09 VITALS
HEART RATE: 72 BPM | BODY MASS INDEX: 30.65 KG/M2 | WEIGHT: 173 LBS | DIASTOLIC BLOOD PRESSURE: 64 MMHG | HEIGHT: 63 IN | RESPIRATION RATE: 16 BRPM | OXYGEN SATURATION: 100 % | SYSTOLIC BLOOD PRESSURE: 100 MMHG

## 2021-09-09 DIAGNOSIS — R53.83 FATIGUE, UNSPECIFIED TYPE: ICD-10-CM

## 2021-09-09 DIAGNOSIS — M79.604 LOWER EXTREMITY PAIN, RIGHT: ICD-10-CM

## 2021-09-09 DIAGNOSIS — G43.809 OTHER MIGRAINE WITHOUT STATUS MIGRAINOSUS, NOT INTRACTABLE: Primary | ICD-10-CM

## 2021-09-09 DIAGNOSIS — F32.A DEPRESSION, UNSPECIFIED DEPRESSION TYPE: ICD-10-CM

## 2021-09-09 DIAGNOSIS — M25.561 ACUTE PAIN OF RIGHT KNEE: ICD-10-CM

## 2021-09-09 DIAGNOSIS — F41.9 ANXIETY: ICD-10-CM

## 2021-09-09 PROBLEM — G43.909 MIGRAINE: Status: ACTIVE | Noted: 2021-09-09

## 2021-09-09 LAB
BASOPHILS # BLD AUTO: 0.12 10*3/MM3 (ref 0–0.2)
BASOPHILS NFR BLD AUTO: 1.3 % (ref 0–1.5)
DEPRECATED RDW RBC AUTO: 45.2 FL (ref 37–54)
EOSINOPHIL # BLD AUTO: 0.26 10*3/MM3 (ref 0–0.4)
EOSINOPHIL NFR BLD AUTO: 2.7 % (ref 0.3–6.2)
ERYTHROCYTE [DISTWIDTH] IN BLOOD BY AUTOMATED COUNT: 13.1 % (ref 12.3–15.4)
FERRITIN SERPL-MCNC: 70.7 NG/ML (ref 13–150)
FOLATE SERPL-MCNC: <2 NG/ML (ref 4.78–24.2)
HCT VFR BLD AUTO: 34.9 % (ref 34–46.6)
HGB BLD-MCNC: 10.8 G/DL (ref 12–15.9)
IMM GRANULOCYTES # BLD AUTO: 0.03 10*3/MM3 (ref 0–0.05)
IMM GRANULOCYTES NFR BLD AUTO: 0.3 % (ref 0–0.5)
IRON 24H UR-MRATE: 49 MCG/DL (ref 37–145)
IRON SATN MFR SERPL: 17 % (ref 20–50)
LYMPHOCYTES # BLD AUTO: 1.78 10*3/MM3 (ref 0.7–3.1)
LYMPHOCYTES NFR BLD AUTO: 18.5 % (ref 19.6–45.3)
MCH RBC QN AUTO: 29.1 PG (ref 26.6–33)
MCHC RBC AUTO-ENTMCNC: 30.9 G/DL (ref 31.5–35.7)
MCV RBC AUTO: 94.1 FL (ref 79–97)
MONOCYTES # BLD AUTO: 0.74 10*3/MM3 (ref 0.1–0.9)
MONOCYTES NFR BLD AUTO: 7.7 % (ref 5–12)
NEUTROPHILS NFR BLD AUTO: 6.67 10*3/MM3 (ref 1.7–7)
NEUTROPHILS NFR BLD AUTO: 69.5 % (ref 42.7–76)
NRBC BLD AUTO-RTO: 0 /100 WBC (ref 0–0.2)
PLATELET # BLD AUTO: 321 10*3/MM3 (ref 140–450)
PMV BLD AUTO: 11.6 FL (ref 6–12)
RBC # BLD AUTO: 3.71 10*6/MM3 (ref 3.77–5.28)
T4 FREE SERPL-MCNC: 1.01 NG/DL (ref 0.93–1.7)
TIBC SERPL-MCNC: 288 MCG/DL (ref 298–536)
TRANSFERRIN SERPL-MCNC: 193 MG/DL (ref 200–360)
TSH SERPL DL<=0.05 MIU/L-ACNC: 2.95 UIU/ML (ref 0.27–4.2)
VIT B12 BLD-MCNC: 417 PG/ML (ref 211–946)
WBC # BLD AUTO: 9.6 10*3/MM3 (ref 3.4–10.8)

## 2021-09-09 PROCEDURE — 84439 ASSAY OF FREE THYROXINE: CPT

## 2021-09-09 PROCEDURE — 82746 ASSAY OF FOLIC ACID SERUM: CPT

## 2021-09-09 PROCEDURE — 80053 COMPREHEN METABOLIC PANEL: CPT

## 2021-09-09 PROCEDURE — 84466 ASSAY OF TRANSFERRIN: CPT

## 2021-09-09 PROCEDURE — 82728 ASSAY OF FERRITIN: CPT

## 2021-09-09 PROCEDURE — 36415 COLL VENOUS BLD VENIPUNCTURE: CPT

## 2021-09-09 PROCEDURE — 99214 OFFICE O/P EST MOD 30 MIN: CPT | Performed by: NURSE PRACTITIONER

## 2021-09-09 PROCEDURE — 83540 ASSAY OF IRON: CPT

## 2021-09-09 PROCEDURE — 82607 VITAMIN B-12: CPT

## 2021-09-09 PROCEDURE — 85025 COMPLETE CBC W/AUTO DIFF WBC: CPT

## 2021-09-09 PROCEDURE — 73562 X-RAY EXAM OF KNEE 3: CPT

## 2021-09-09 PROCEDURE — 84443 ASSAY THYROID STIM HORMONE: CPT

## 2021-09-09 PROCEDURE — 73590 X-RAY EXAM OF LOWER LEG: CPT

## 2021-09-09 RX ORDER — ESCITALOPRAM OXALATE 10 MG/1
10 TABLET ORAL DAILY
Qty: 30 TABLET | Refills: 2 | Status: SHIPPED | OUTPATIENT
Start: 2021-09-09 | End: 2021-12-07

## 2021-09-09 RX ORDER — TOPIRAMATE 25 MG/1
25 TABLET ORAL 2 TIMES DAILY
Qty: 180 TABLET | Refills: 1 | Status: SHIPPED | OUTPATIENT
Start: 2021-09-09 | End: 2022-03-15 | Stop reason: SDUPTHER

## 2021-09-09 NOTE — ASSESSMENT & PLAN NOTE
Discussed with patient that we will try Lexapro in place of the Effexor.  Discussed with patient how to switch over the medications.  We will follow-up in 6 weeks for reevaluation.

## 2021-09-09 NOTE — PROGRESS NOTES
Chief Complaint  Headache, Anxiety, and Depression    Subjective          Sandra Dhillon presents to [facility] for   History of Present Illness    Patient is here for a follow up on migraines, anxiety, depression.  States that she does not feel that the Effexor is very effective, would like to change his medication.  States that it does not seem to be helping her anxiety much.  Denies any SI.    Patient is having right leg pain. Patient saw a podiatrist but was not treated for the pain in her legs.  States that she was initially having a lot of pain and swelling around her ankle, but that the ankle pain is actually somewhat resolved but she continues to have pain up the lateral side of her right calf and behind her knee.  States she does have some small varicose veins in this area but that the pain does not seem to be coming from them.  Denies any known injury.  Had an x-ray of her ankle and that was normal, has not had any additional x-rays or evaluation.  States this has been going on for several months now.  Patient has no history of DVT, no warmth or swelling currently.    Patient is also complaining of some chronic fatigue.  States that she sleeps very well but wakes up feeling very tired.  States sometimes it seems hard to hold her eyes open.  She states that she has not had labs in a while and was mildly anemic in the past.    Medical History  Past Medical History:   Diagnosis Date   • Anxiety    • Arthritis    • Depression    • Foot pain, right    • Forgetfulness    • Heel pain    • Hemorrhoids    • Hidradenitis suppurativa    • Ingrown toenail    • Migraines    • Numbness in feet      Surgical History  Past Surgical History:   Procedure Laterality Date   • OTHER SURGICAL HISTORY      Arm surgery   • OTHER SURGICAL HISTORY      Excision, sweat glands, inguinal region     Social History  Social History     Socioeconomic History   • Marital status:      Spouse name: Not  "on file   • Number of children: Not on file   • Years of education: Not on file   • Highest education level: Not on file   Tobacco Use   • Smoking status: Current Every Day Smoker     Packs/day: 1.00   • Smokeless tobacco: Never Used   Vaping Use   • Vaping Use: Never used   Substance and Sexual Activity   • Alcohol use: Yes   • Drug use: Never   • Sexual activity: Defer       Current Outpatient Medications:   •  diclofenac (VOLTAREN) 75 MG EC tablet, TAKE 1 TABLET(75 MG) BY MOUTH TWICE DAILY, Disp: 60 tablet, Rfl: 0  •  topiramate (TOPAMAX) 25 MG tablet, Take 1 tablet by mouth 2 (Two) Times a Day., Disp: 180 tablet, Rfl: 1  •  escitalopram (Lexapro) 10 MG tablet, Take 1 tablet by mouth Daily., Disp: 30 tablet, Rfl: 2    Review of Systems     Objective     /64 (BP Location: Left arm, Patient Position: Sitting, Cuff Size: Adult)   Pulse 72   Resp 16   Ht 160 cm (63\")   Wt 78.5 kg (173 lb)   SpO2 100%   BMI 30.65 kg/m²     Body mass index is 30.65 kg/m².    Physical Exam  Vitals reviewed.   Constitutional:       Appearance: Normal appearance. She is well-developed.   HENT:      Head: Normocephalic and atraumatic.      Right Ear: External ear normal.      Left Ear: External ear normal.      Mouth/Throat:      Pharynx: No oropharyngeal exudate.   Eyes:      Conjunctiva/sclera: Conjunctivae normal.      Pupils: Pupils are equal, round, and reactive to light.   Cardiovascular:      Rate and Rhythm: Normal rate and regular rhythm.      Heart sounds: No murmur heard.   No friction rub. No gallop.    Pulmonary:      Effort: Pulmonary effort is normal.      Breath sounds: Normal breath sounds. No wheezing or rhonchi.   Abdominal:      General: Bowel sounds are normal. There is no distension.      Palpations: Abdomen is soft.      Tenderness: There is no abdominal tenderness.   Musculoskeletal:      Right lower leg: Tenderness and bony tenderness present. No swelling. No edema.      Comments: Mildly tender on the " lateral right calf, no specific point tenderness.  Patient reports pain up the shin and calf with moving the legs in certain positions.   Skin:     General: Skin is warm and dry.   Neurological:      Mental Status: She is alert and oriented to person, place, and time.      Cranial Nerves: No cranial nerve deficit.   Psychiatric:         Mood and Affect: Mood and affect normal.         Behavior: Behavior normal.         Thought Content: Thought content normal.         Judgment: Judgment normal.         Result Review :     The following data was reviewed by: MEME Child on 09/09/2021:    CMP    CMP 12/22/20   Glucose 78   BUN 6   Creatinine 0.72   Sodium 138   Potassium 3.5   Chloride 105   Calcium 8.9   Albumin 4.0   Total Bilirubin 0.24   Alkaline Phosphatase 132 (A)   AST (SGOT) 24   ALT (SGPT) 15   (A) Abnormal value            CBC    CBC 12/22/20   WBC 11.78 (A)   RBC 3.94 (A)   Hemoglobin 11.4 (A)   Hematocrit 36.1 (A)   MCV 91.6   MCH 28.9   MCHC 31.6 (A)   RDW 13.9   Platelets 378   (A) Abnormal value                               Assessment:  Diagnoses and all orders for this visit:    1. Other migraine without status migrainosus, not intractable (Primary)  -     topiramate (TOPAMAX) 25 MG tablet; Take 1 tablet by mouth 2 (Two) Times a Day.  Dispense: 180 tablet; Refill: 1    2. Depression, unspecified depression type  Assessment & Plan:  Trialing Lexapro.    Orders:  -     escitalopram (Lexapro) 10 MG tablet; Take 1 tablet by mouth Daily.  Dispense: 30 tablet; Refill: 2    3. Anxiety  Assessment & Plan:  Discussed with patient that we will try Lexapro in place of the Effexor.  Discussed with patient how to switch over the medications.  We will follow-up in 6 weeks for reevaluation.    Orders:  -     escitalopram (Lexapro) 10 MG tablet; Take 1 tablet by mouth Daily.  Dispense: 30 tablet; Refill: 2    4. Acute pain of right knee  -     XR Knee 3 View Right; Future    5. Lower extremity pain,  right  -     XR Tibia Fibula 2 View Right; Future    6. Fatigue, unspecified type  -     CBC & Differential; Future  -     Comprehensive Metabolic Panel; Future  -     TSH; Future  -     T4, Free; Future  -     Vitamin B12; Future  -     Folate; Future  -     Iron Profile; Future  -     Ferritin; Future              Follow Up     No follow-ups on file.    Patient was given instructions and counseling regarding her condition or for health maintenance advice. Please see specific information pulled into the AVS if appropriate.     Veronica Liang, MEME  09/09/2021

## 2021-09-10 LAB
ALBUMIN SERPL-MCNC: 3.7 G/DL (ref 3.5–5.2)
ALBUMIN/GLOB SERPL: 1.3 G/DL
ALP SERPL-CCNC: 129 U/L (ref 39–117)
ALT SERPL W P-5'-P-CCNC: 16 U/L (ref 1–33)
ANION GAP SERPL CALCULATED.3IONS-SCNC: 9.4 MMOL/L (ref 5–15)
AST SERPL-CCNC: 16 U/L (ref 1–32)
BILIRUB SERPL-MCNC: <0.2 MG/DL (ref 0–1.2)
BUN SERPL-MCNC: 18 MG/DL (ref 6–20)
BUN/CREAT SERPL: 23.1 (ref 7–25)
CALCIUM SPEC-SCNC: 9.3 MG/DL (ref 8.6–10.5)
CHLORIDE SERPL-SCNC: 100 MMOL/L (ref 98–107)
CO2 SERPL-SCNC: 26.6 MMOL/L (ref 22–29)
CREAT SERPL-MCNC: 0.78 MG/DL (ref 0.57–1)
GFR SERPL CREATININE-BSD FRML MDRD: 81 ML/MIN/1.73
GLOBULIN UR ELPH-MCNC: 2.8 GM/DL
GLUCOSE SERPL-MCNC: 75 MG/DL (ref 65–99)
POTASSIUM SERPL-SCNC: 4.7 MMOL/L (ref 3.5–5.2)
PROT SERPL-MCNC: 6.5 G/DL (ref 6–8.5)
SODIUM SERPL-SCNC: 136 MMOL/L (ref 136–145)

## 2021-09-27 ENCOUNTER — TELEPHONE (OUTPATIENT)
Dept: FAMILY MEDICINE CLINIC | Facility: CLINIC | Age: 41
End: 2021-09-27

## 2021-09-27 DIAGNOSIS — D64.9 ANEMIA, UNSPECIFIED TYPE: Primary | ICD-10-CM

## 2021-10-29 ENCOUNTER — HOSPITAL ENCOUNTER (EMERGENCY)
Facility: HOSPITAL | Age: 41
Discharge: HOME | End: 2021-10-30
Attending: EMERGENCY MEDICINE | Admitting: EMERGENCY MEDICINE

## 2021-10-29 ENCOUNTER — APPOINTMENT (OUTPATIENT)
Dept: GENERAL RADIOLOGY | Facility: HOSPITAL | Age: 41
End: 2021-10-29

## 2021-10-29 VITALS
SYSTOLIC BLOOD PRESSURE: 141 MMHG | OXYGEN SATURATION: 100 % | HEART RATE: 54 BPM | WEIGHT: 169.53 LBS | HEIGHT: 63 IN | RESPIRATION RATE: 20 BRPM | BODY MASS INDEX: 30.04 KG/M2 | DIASTOLIC BLOOD PRESSURE: 73 MMHG | TEMPERATURE: 98.3 F

## 2021-10-29 DIAGNOSIS — M54.50 ACUTE BILATERAL LOW BACK PAIN WITHOUT SCIATICA: Primary | ICD-10-CM

## 2021-10-29 DIAGNOSIS — R07.9 CHEST PAIN, UNSPECIFIED TYPE: ICD-10-CM

## 2021-10-29 LAB
ALBUMIN SERPL-MCNC: 4.3 G/DL (ref 3.5–5.2)
ALBUMIN/GLOB SERPL: 1.2 G/DL
ALP SERPL-CCNC: 140 U/L (ref 39–117)
ALT SERPL W P-5'-P-CCNC: 8 U/L (ref 1–33)
ANION GAP SERPL CALCULATED.3IONS-SCNC: 8.1 MMOL/L (ref 5–15)
AST SERPL-CCNC: 11 U/L (ref 1–32)
BACTERIA UR QL AUTO: ABNORMAL /HPF
BASOPHILS # BLD AUTO: 0.13 10*3/MM3 (ref 0–0.2)
BASOPHILS NFR BLD AUTO: 1.1 % (ref 0–1.5)
BILIRUB SERPL-MCNC: 0.2 MG/DL (ref 0–1.2)
BILIRUB UR QL STRIP: NEGATIVE
BUN SERPL-MCNC: 6 MG/DL (ref 6–20)
BUN/CREAT SERPL: 7.7 (ref 7–25)
CALCIUM SPEC-SCNC: 9 MG/DL (ref 8.6–10.5)
CHLORIDE SERPL-SCNC: 103 MMOL/L (ref 98–107)
CK MB SERPL-CCNC: 1.14 NG/ML
CK SERPL-CCNC: 70 U/L (ref 20–180)
CLARITY UR: CLEAR
CO2 SERPL-SCNC: 25.9 MMOL/L (ref 22–29)
COLOR UR: YELLOW
CREAT SERPL-MCNC: 0.78 MG/DL (ref 0.57–1)
DEPRECATED RDW RBC AUTO: 44.1 FL (ref 37–54)
EOSINOPHIL # BLD AUTO: 0.34 10*3/MM3 (ref 0–0.4)
EOSINOPHIL NFR BLD AUTO: 2.8 % (ref 0.3–6.2)
ERYTHROCYTE [DISTWIDTH] IN BLOOD BY AUTOMATED COUNT: 13.2 % (ref 12.3–15.4)
GFR SERPL CREATININE-BSD FRML MDRD: 81 ML/MIN/1.73
GLOBULIN UR ELPH-MCNC: 3.7 GM/DL
GLUCOSE SERPL-MCNC: 97 MG/DL (ref 65–99)
GLUCOSE UR STRIP-MCNC: NEGATIVE MG/DL
HCT VFR BLD AUTO: 37.5 % (ref 34–46.6)
HGB BLD-MCNC: 12.4 G/DL (ref 12–15.9)
HGB UR QL STRIP.AUTO: ABNORMAL
HOLD SPECIMEN: NORMAL
HYALINE CASTS UR QL AUTO: ABNORMAL /LPF
IMM GRANULOCYTES # BLD AUTO: 0.03 10*3/MM3 (ref 0–0.05)
IMM GRANULOCYTES NFR BLD AUTO: 0.2 % (ref 0–0.5)
KETONES UR QL STRIP: NEGATIVE
LEUKOCYTE ESTERASE UR QL STRIP.AUTO: NEGATIVE
LIPASE SERPL-CCNC: 24 U/L (ref 13–60)
LYMPHOCYTES # BLD AUTO: 2.49 10*3/MM3 (ref 0.7–3.1)
LYMPHOCYTES NFR BLD AUTO: 20.5 % (ref 19.6–45.3)
MAGNESIUM SERPL-MCNC: 1.9 MG/DL (ref 1.6–2.6)
MCH RBC QN AUTO: 30.1 PG (ref 26.6–33)
MCHC RBC AUTO-ENTMCNC: 33.1 G/DL (ref 31.5–35.7)
MCV RBC AUTO: 91 FL (ref 79–97)
MONOCYTES # BLD AUTO: 0.78 10*3/MM3 (ref 0.1–0.9)
MONOCYTES NFR BLD AUTO: 6.4 % (ref 5–12)
NEUTROPHILS NFR BLD AUTO: 69 % (ref 42.7–76)
NEUTROPHILS NFR BLD AUTO: 8.35 10*3/MM3 (ref 1.7–7)
NITRITE UR QL STRIP: NEGATIVE
NRBC BLD AUTO-RTO: 0 /100 WBC (ref 0–0.2)
NT-PROBNP SERPL-MCNC: 280.3 PG/ML (ref 0–450)
PH UR STRIP.AUTO: 6 [PH] (ref 5–8)
PLATELET # BLD AUTO: 361 10*3/MM3 (ref 140–450)
PMV BLD AUTO: 10.9 FL (ref 6–12)
POTASSIUM SERPL-SCNC: 3.3 MMOL/L (ref 3.5–5.2)
PROT SERPL-MCNC: 8 G/DL (ref 6–8.5)
PROT UR QL STRIP: NEGATIVE
RBC # BLD AUTO: 4.12 10*6/MM3 (ref 3.77–5.28)
RBC # UR: ABNORMAL /HPF
REF LAB TEST METHOD: ABNORMAL
SODIUM SERPL-SCNC: 137 MMOL/L (ref 136–145)
SP GR UR STRIP: 1.01 (ref 1–1.03)
SQUAMOUS #/AREA URNS HPF: ABNORMAL /HPF
TROPONIN I SERPL-MCNC: 0.01 NG/ML (ref 0–0.6)
TROPONIN I SERPL-MCNC: 0.01 NG/ML (ref 0–0.6)
UROBILINOGEN UR QL STRIP: ABNORMAL
WBC # BLD AUTO: 12.12 10*3/MM3 (ref 3.4–10.8)
WBC UR QL AUTO: ABNORMAL /HPF
WHOLE BLOOD HOLD SPECIMEN: NORMAL
WHOLE BLOOD HOLD SPECIMEN: NORMAL

## 2021-10-29 PROCEDURE — 96374 THER/PROPH/DIAG INJ IV PUSH: CPT

## 2021-10-29 PROCEDURE — 99283 EMERGENCY DEPT VISIT LOW MDM: CPT

## 2021-10-29 PROCEDURE — 93005 ELECTROCARDIOGRAM TRACING: CPT | Performed by: EMERGENCY MEDICINE

## 2021-10-29 PROCEDURE — 83735 ASSAY OF MAGNESIUM: CPT | Performed by: EMERGENCY MEDICINE

## 2021-10-29 PROCEDURE — 83880 ASSAY OF NATRIURETIC PEPTIDE: CPT | Performed by: EMERGENCY MEDICINE

## 2021-10-29 PROCEDURE — 80053 COMPREHEN METABOLIC PANEL: CPT | Performed by: EMERGENCY MEDICINE

## 2021-10-29 PROCEDURE — 71045 X-RAY EXAM CHEST 1 VIEW: CPT

## 2021-10-29 PROCEDURE — 84484 ASSAY OF TROPONIN QUANT: CPT

## 2021-10-29 PROCEDURE — 25010000002 KETOROLAC TROMETHAMINE PER 15 MG: Performed by: EMERGENCY MEDICINE

## 2021-10-29 PROCEDURE — 87086 URINE CULTURE/COLONY COUNT: CPT | Performed by: EMERGENCY MEDICINE

## 2021-10-29 PROCEDURE — 83690 ASSAY OF LIPASE: CPT | Performed by: EMERGENCY MEDICINE

## 2021-10-29 PROCEDURE — 36415 COLL VENOUS BLD VENIPUNCTURE: CPT

## 2021-10-29 PROCEDURE — 82553 CREATINE MB FRACTION: CPT | Performed by: EMERGENCY MEDICINE

## 2021-10-29 PROCEDURE — 85025 COMPLETE CBC W/AUTO DIFF WBC: CPT | Performed by: EMERGENCY MEDICINE

## 2021-10-29 PROCEDURE — 82550 ASSAY OF CK (CPK): CPT | Performed by: EMERGENCY MEDICINE

## 2021-10-29 PROCEDURE — 81001 URINALYSIS AUTO W/SCOPE: CPT | Performed by: EMERGENCY MEDICINE

## 2021-10-29 RX ORDER — ASPIRIN 81 MG/1
324 TABLET, CHEWABLE ORAL ONCE
Status: DISCONTINUED | OUTPATIENT
Start: 2021-10-29 | End: 2021-10-30 | Stop reason: HOSPADM

## 2021-10-29 RX ORDER — SODIUM CHLORIDE 0.9 % (FLUSH) 0.9 %
10 SYRINGE (ML) INJECTION AS NEEDED
Status: DISCONTINUED | OUTPATIENT
Start: 2021-10-29 | End: 2021-10-30 | Stop reason: HOSPADM

## 2021-10-29 RX ORDER — NAPROXEN 500 MG/1
500 TABLET ORAL 2 TIMES DAILY WITH MEALS
Qty: 20 TABLET | Refills: 0 | Status: SHIPPED | OUTPATIENT
Start: 2021-10-29 | End: 2022-03-15

## 2021-10-29 RX ORDER — HYDROCODONE BITARTRATE AND ACETAMINOPHEN 5; 325 MG/1; MG/1
1-2 TABLET ORAL EVERY 6 HOURS PRN
Qty: 10 TABLET | Refills: 0 | Status: SHIPPED | OUTPATIENT
Start: 2021-10-29 | End: 2022-03-15

## 2021-10-29 RX ORDER — HYDROCODONE BITARTRATE AND ACETAMINOPHEN 7.5; 325 MG/1; MG/1
1 TABLET ORAL ONCE
Status: COMPLETED | OUTPATIENT
Start: 2021-10-29 | End: 2021-10-29

## 2021-10-29 RX ORDER — KETOROLAC TROMETHAMINE 30 MG/ML
30 INJECTION, SOLUTION INTRAMUSCULAR; INTRAVENOUS ONCE
Status: COMPLETED | OUTPATIENT
Start: 2021-10-29 | End: 2021-10-29

## 2021-10-29 RX ORDER — FERROUS SULFATE 325(65) MG
325 TABLET ORAL
COMMUNITY
End: 2022-03-15 | Stop reason: SDUPTHER

## 2021-10-29 RX ADMIN — KETOROLAC TROMETHAMINE 30 MG: 30 INJECTION, SOLUTION INTRAMUSCULAR; INTRAVENOUS at 22:10

## 2021-10-29 RX ADMIN — HYDROCODONE BITARTRATE AND ACETAMINOPHEN 1 TABLET: 7.5; 325 TABLET ORAL at 22:11

## 2021-10-30 LAB — BACTERIA SPEC AEROBE CULT: NORMAL

## 2021-10-31 LAB
QT INTERVAL: 417 MS
QT INTERVAL: 482 MS

## 2021-11-22 RX ORDER — DICLOFENAC SODIUM 75 MG/1
TABLET, DELAYED RELEASE ORAL
Qty: 60 TABLET | Refills: 0 | OUTPATIENT
Start: 2021-11-22

## 2021-12-07 DIAGNOSIS — F32.A DEPRESSION, UNSPECIFIED DEPRESSION TYPE: ICD-10-CM

## 2021-12-07 DIAGNOSIS — F41.9 ANXIETY: ICD-10-CM

## 2021-12-07 RX ORDER — ESCITALOPRAM OXALATE 10 MG/1
10 TABLET ORAL DAILY
Qty: 30 TABLET | Refills: 0 | Status: SHIPPED | OUTPATIENT
Start: 2021-12-07 | End: 2022-01-04

## 2021-12-07 NOTE — TELEPHONE ENCOUNTER
Patient overdue for office visit to follow-up on medication change, please contact her and schedule follow-up

## 2022-01-04 DIAGNOSIS — F41.9 ANXIETY: ICD-10-CM

## 2022-01-04 DIAGNOSIS — F32.A DEPRESSION, UNSPECIFIED DEPRESSION TYPE: ICD-10-CM

## 2022-01-04 RX ORDER — ESCITALOPRAM OXALATE 10 MG/1
10 TABLET ORAL DAILY
Qty: 30 TABLET | Refills: 0 | Status: SHIPPED | OUTPATIENT
Start: 2022-01-04 | End: 2022-03-15 | Stop reason: SDUPTHER

## 2022-02-03 DIAGNOSIS — F32.A DEPRESSION, UNSPECIFIED DEPRESSION TYPE: ICD-10-CM

## 2022-02-03 DIAGNOSIS — F41.9 ANXIETY: ICD-10-CM

## 2022-02-03 RX ORDER — ESCITALOPRAM OXALATE 10 MG/1
10 TABLET ORAL DAILY
Qty: 30 TABLET | Refills: 0 | OUTPATIENT
Start: 2022-02-03

## 2022-02-03 NOTE — TELEPHONE ENCOUNTER
PT needs appt for refills per Jessica.     PT aware.    Pt will call back to schedule appt. Pt is working 2 jobs at this time.

## 2022-03-09 RX ORDER — SILVER SULFADIAZINE 1 %
CREAM (GRAM) TOPICAL
Qty: 50 G | Refills: 2 | Status: SHIPPED | OUTPATIENT
Start: 2022-03-09 | End: 2022-07-14 | Stop reason: SDUPTHER

## 2022-03-09 NOTE — TELEPHONE ENCOUNTER
Rx Refill Note  Requested Prescriptions     Pending Prescriptions Disp Refills   • SSD 1 % cream [Pharmacy Med Name: SILVER SULFADIAZINE 1% CREAM 50GM] 50 g      Sig: APPLY TO THE AFFECTED AREA TWICE DAILY AS NEEDED      Last office visit with prescribing clinician: Last appointment 3-9-21.  Rx for silvadene cream #1 apply bid prn x 1yr given on 3-9-21.  This is a request from a pharmacy.      Next office visit with prescribing clinician: No appointment scheduled.  P  Encounters:23}         Dayanara Banerjee MA  03/09/22, 15:13 EST

## 2022-03-15 ENCOUNTER — OFFICE VISIT (OUTPATIENT)
Dept: FAMILY MEDICINE CLINIC | Facility: CLINIC | Age: 42
End: 2022-03-15

## 2022-03-15 VITALS
WEIGHT: 200.4 LBS | DIASTOLIC BLOOD PRESSURE: 74 MMHG | HEART RATE: 67 BPM | BODY MASS INDEX: 35.51 KG/M2 | HEIGHT: 63 IN | OXYGEN SATURATION: 100 % | SYSTOLIC BLOOD PRESSURE: 131 MMHG

## 2022-03-15 DIAGNOSIS — F32.A DEPRESSION, UNSPECIFIED DEPRESSION TYPE: ICD-10-CM

## 2022-03-15 DIAGNOSIS — G43.809 OTHER MIGRAINE WITHOUT STATUS MIGRAINOSUS, NOT INTRACTABLE: ICD-10-CM

## 2022-03-15 DIAGNOSIS — F41.9 ANXIETY: ICD-10-CM

## 2022-03-15 DIAGNOSIS — L73.2 HIDRADENITIS SUPPURATIVA: ICD-10-CM

## 2022-03-15 DIAGNOSIS — R53.83 FATIGUE, UNSPECIFIED TYPE: ICD-10-CM

## 2022-03-15 DIAGNOSIS — D50.9 IRON DEFICIENCY ANEMIA, UNSPECIFIED IRON DEFICIENCY ANEMIA TYPE: Primary | ICD-10-CM

## 2022-03-15 PROBLEM — G00.9 BACTERIAL MENINGITIS: Status: ACTIVE | Noted: 2022-03-15

## 2022-03-15 PROBLEM — M79.671 FOOT PAIN, RIGHT: Status: ACTIVE | Noted: 2022-03-15

## 2022-03-15 PROBLEM — M19.90 ARTHRITIS: Status: ACTIVE | Noted: 2022-03-15

## 2022-03-15 PROBLEM — L60.0 INGROWN TOENAIL: Status: ACTIVE | Noted: 2022-03-15

## 2022-03-15 PROBLEM — K64.9 HEMORRHOIDS: Status: ACTIVE | Noted: 2022-03-15

## 2022-03-15 PROCEDURE — 99214 OFFICE O/P EST MOD 30 MIN: CPT | Performed by: NURSE PRACTITIONER

## 2022-03-15 RX ORDER — TOPIRAMATE 25 MG/1
25 TABLET ORAL 2 TIMES DAILY
Qty: 60 TABLET | Refills: 2 | Status: SHIPPED | OUTPATIENT
Start: 2022-03-15 | End: 2022-08-02 | Stop reason: SDUPTHER

## 2022-03-15 RX ORDER — ERGOCALCIFEROL (VITAMIN D2) 10 MCG
1 TABLET ORAL DAILY
Qty: 30 TABLET | Refills: 2 | Status: SHIPPED | OUTPATIENT
Start: 2022-03-15 | End: 2022-08-02 | Stop reason: SDUPTHER

## 2022-03-15 RX ORDER — FERROUS SULFATE 325(65) MG
325 TABLET ORAL
Qty: 30 TABLET | Refills: 2 | Status: SHIPPED | OUTPATIENT
Start: 2022-03-15 | End: 2022-08-02 | Stop reason: SDUPTHER

## 2022-03-15 RX ORDER — ESCITALOPRAM OXALATE 10 MG/1
10 TABLET ORAL DAILY
Qty: 30 TABLET | Refills: 2 | Status: SHIPPED | OUTPATIENT
Start: 2022-03-15 | End: 2022-08-02 | Stop reason: SDUPTHER

## 2022-03-15 NOTE — PROGRESS NOTES
"Chief Complaint  Anxiety, Depression, Headache, and Med Refill    Subjective          Sandra Dhillon presents to [facility]  History of Present Illness     admits chronic hidradenitits-admits surgery in the past. She has tried and failed doxy, humira, keflex, metformin, steroids. She sees derm in [place]    Anxiety/depression-uncontrolled due to being out of lexapro x 1m-refilled today.     Migraines-states seh has had more migraines in the past month but feels it is r/t anxiety-she has been out of lexapro x 1m. States 3 this month. Refilled Topamax.    Iron def anemia-she has been taking an otc iron supplemement but admits fatigue-sent in ferrous sulfate 325mg qd.     She  has a past medical history of Anxiety, Arthritis, Depression, Foot pain, right, Forgetfulness, Heel pain, Hemorrhoids, Hidradenitis suppurativa, Ingrown toenail, Migraines, and Numbness in feet.     Objective   Vital Signs:   /74 (BP Location: Right arm, Patient Position: Sitting, Cuff Size: Adult)   Pulse 67   Ht 160 cm (63\")   Wt 90.9 kg (200 lb 6.4 oz)   SpO2 100%   BMI 35.50 kg/m²     Physical Exam  Constitutional:       Appearance: Normal appearance.   Cardiovascular:      Rate and Rhythm: Normal rate and regular rhythm.      Pulses: Normal pulses.      Heart sounds: Normal heart sounds.   Pulmonary:      Effort: Pulmonary effort is normal.      Breath sounds: Normal breath sounds.   Neurological:      General: No focal deficit present.      Mental Status: She is alert and oriented to person, place, and time.   Psychiatric:         Mood and Affect: Mood normal.         Behavior: Behavior normal.        Result Review :            Past Surgical History:   Procedure Laterality Date   • D & C CERVICAL BIOPSY     • OTHER SURGICAL HISTORY      Arm surgery   • OTHER SURGICAL HISTORY      Excision, sweat glands, inguinal region   • TUBAL ABDOMINAL LIGATION        Family History   Problem Relation Age of " Onset   • Cancer Mother         Unspecified   • Cancer Maternal Grandfather         Thyroid pancreatic   • Diabetes Other         Current Outpatient Medications:   •  escitalopram (LEXAPRO) 10 MG tablet, Take 1 tablet by mouth Daily., Disp: 30 tablet, Rfl: 2  •  ferrous sulfate 325 (65 FE) MG tablet, Take 1 tablet by mouth Daily With Breakfast., Disp: 30 tablet, Rfl: 2  •  topiramate (TOPAMAX) 25 MG tablet, Take 1 tablet by mouth 2 (Two) Times a Day., Disp: 60 tablet, Rfl: 2  •  Folic Acid (FOLATE PO), Take  by mouth., Disp: , Rfl:   •  Prenatal Vit-Fe Fumarate-FA (Prenatal One Daily) 27-0.8 MG tablet, Take 1 each by mouth Daily., Disp: 30 tablet, Rfl: 2  •  SSD 1 % cream, APPLY TO THE AFFECTED AREA TWICE DAILY AS NEEDED, Disp: 50 g, Rfl: 2   Assessment and Plan    Diagnoses and all orders for this visit:    1. Iron deficiency anemia, unspecified iron deficiency anemia type (Primary)  Comments:  she has been taking an otc iron supplemement but admits fatigue-sent in ferrous sulfate 325mg qd.   Orders:  -     ferrous sulfate 325 (65 FE) MG tablet; Take 1 tablet by mouth Daily With Breakfast.  Dispense: 30 tablet; Refill: 2  -     Prenatal Vit-Fe Fumarate-FA (Prenatal One Daily) 27-0.8 MG tablet; Take 1 each by mouth Daily.  Dispense: 30 tablet; Refill: 2    2. Depression, unspecified depression type  Comments:  uncontrolled due to being out of lexapro x 1m-refilled today.   Orders:  -     escitalopram (LEXAPRO) 10 MG tablet; Take 1 tablet by mouth Daily.  Dispense: 30 tablet; Refill: 2    3. Anxiety  -     escitalopram (LEXAPRO) 10 MG tablet; Take 1 tablet by mouth Daily.  Dispense: 30 tablet; Refill: 2    4. Other migraine without status migrainosus, not intractable  Comments:  states clara has had more migraines in the past month but feels it is r/t anxiety-she has been out of lexapro x 1m. States 3 this month. Refilled Topamax.   Orders:  -     topiramate (TOPAMAX) 25 MG tablet; Take 1 tablet by mouth 2 (Two) Times  a Day.  Dispense: 60 tablet; Refill: 2    5. Hidradenitis suppurativa  Comments:   chronic hidradenitits-admits surgery in the past. She has tried and failed doxy, humira, keflex, metformin, steroids. She sees derm in [place]    6. Fatigue, unspecified type  -     Vitamin B12 & Folate  -     Vitamin D 25 Hydroxy; Future  -     TSH  -     CBC Auto Differential  -     Iron Profile  -     Ferritin      Follow Up   Return in about 3 months (around 6/15/2022).  Patient was given instructions and counseling regarding her condition or for health maintenance advice. Please see specific information pulled into the AVS if appropriate.     Sandra Dhillon  reports that she has quit smoking. She smoked 1.00 pack per day. She has never used smokeless tobacco..         MEME Walsh    The patient is advised to continue current medications.

## 2022-06-21 DIAGNOSIS — G43.809 OTHER MIGRAINE WITHOUT STATUS MIGRAINOSUS, NOT INTRACTABLE: ICD-10-CM

## 2022-06-21 DIAGNOSIS — F32.A DEPRESSION, UNSPECIFIED DEPRESSION TYPE: ICD-10-CM

## 2022-06-21 DIAGNOSIS — F41.9 ANXIETY: ICD-10-CM

## 2022-06-21 DIAGNOSIS — D50.9 IRON DEFICIENCY ANEMIA, UNSPECIFIED IRON DEFICIENCY ANEMIA TYPE: ICD-10-CM

## 2022-06-22 RX ORDER — ERGOCALCIFEROL (VITAMIN D2) 10 MCG
1 TABLET ORAL DAILY
Qty: 30 TABLET | Refills: 2 | OUTPATIENT
Start: 2022-06-22

## 2022-06-22 RX ORDER — ESCITALOPRAM OXALATE 10 MG/1
10 TABLET ORAL DAILY
Qty: 30 TABLET | Refills: 2 | OUTPATIENT
Start: 2022-06-22

## 2022-06-22 RX ORDER — TOPIRAMATE 25 MG/1
25 TABLET ORAL 2 TIMES DAILY
Qty: 60 TABLET | Refills: 2 | OUTPATIENT
Start: 2022-06-22

## 2022-06-22 RX ORDER — FERROUS SULFATE 325(65) MG
325 TABLET ORAL
Qty: 30 TABLET | Refills: 2 | OUTPATIENT
Start: 2022-06-22

## 2022-07-14 ENCOUNTER — TELEPHONE (OUTPATIENT)
Dept: OBSTETRICS AND GYNECOLOGY | Facility: CLINIC | Age: 42
End: 2022-07-14

## 2022-07-14 DIAGNOSIS — L73.2 HIDRADENITIS SUPPURATIVA: Primary | ICD-10-CM

## 2022-07-14 NOTE — TELEPHONE ENCOUNTER
Ok'd one refill of Silver Sulfadiazine 1% cream 25gm.  Patient has not been seen since 03/09/2021 and will need to schedule an appointment for further refills/rx renewal.

## 2022-08-02 ENCOUNTER — OFFICE VISIT (OUTPATIENT)
Dept: FAMILY MEDICINE CLINIC | Facility: CLINIC | Age: 42
End: 2022-08-02

## 2022-08-02 ENCOUNTER — LAB (OUTPATIENT)
Dept: LAB | Facility: HOSPITAL | Age: 42
End: 2022-08-02

## 2022-08-02 VITALS
SYSTOLIC BLOOD PRESSURE: 126 MMHG | BODY MASS INDEX: 35.97 KG/M2 | HEART RATE: 66 BPM | WEIGHT: 203 LBS | OXYGEN SATURATION: 100 % | DIASTOLIC BLOOD PRESSURE: 91 MMHG | HEIGHT: 63 IN

## 2022-08-02 DIAGNOSIS — D50.9 IRON DEFICIENCY ANEMIA, UNSPECIFIED IRON DEFICIENCY ANEMIA TYPE: ICD-10-CM

## 2022-08-02 DIAGNOSIS — R06.83 SNORING: ICD-10-CM

## 2022-08-02 DIAGNOSIS — Z13.220 SCREENING FOR LIPID DISORDERS: ICD-10-CM

## 2022-08-02 DIAGNOSIS — Z11.59 NEED FOR HEPATITIS C SCREENING TEST: ICD-10-CM

## 2022-08-02 DIAGNOSIS — Z13.29 SCREENING FOR THYROID DISORDER: ICD-10-CM

## 2022-08-02 DIAGNOSIS — G43.809 OTHER MIGRAINE WITHOUT STATUS MIGRAINOSUS, NOT INTRACTABLE: ICD-10-CM

## 2022-08-02 DIAGNOSIS — F41.9 ANXIETY: ICD-10-CM

## 2022-08-02 DIAGNOSIS — Z01.89 ROUTINE LAB DRAW: ICD-10-CM

## 2022-08-02 DIAGNOSIS — F32.A DEPRESSION, UNSPECIFIED DEPRESSION TYPE: Primary | ICD-10-CM

## 2022-08-02 DIAGNOSIS — Z12.31 BREAST CANCER SCREENING BY MAMMOGRAM: ICD-10-CM

## 2022-08-02 DIAGNOSIS — R40.0 HAS DAYTIME DROWSINESS: ICD-10-CM

## 2022-08-02 LAB
ALBUMIN SERPL-MCNC: 3.7 G/DL (ref 3.5–5.2)
ALBUMIN/GLOB SERPL: 1.1 G/DL
ALP SERPL-CCNC: 114 U/L (ref 39–117)
ALT SERPL W P-5'-P-CCNC: 10 U/L (ref 1–33)
ANION GAP SERPL CALCULATED.3IONS-SCNC: 10 MMOL/L (ref 5–15)
AST SERPL-CCNC: 15 U/L (ref 1–32)
BASOPHILS # BLD AUTO: 0.1 10*3/MM3 (ref 0–0.2)
BASOPHILS NFR BLD AUTO: 1 % (ref 0–1.5)
BILIRUB SERPL-MCNC: 0.3 MG/DL (ref 0–1.2)
BUN SERPL-MCNC: 9 MG/DL (ref 6–20)
BUN/CREAT SERPL: 15.3 (ref 7–25)
CALCIUM SPEC-SCNC: 8.8 MG/DL (ref 8.6–10.5)
CHLORIDE SERPL-SCNC: 104 MMOL/L (ref 98–107)
CHOLEST SERPL-MCNC: 147 MG/DL (ref 0–200)
CO2 SERPL-SCNC: 26 MMOL/L (ref 22–29)
CREAT SERPL-MCNC: 0.59 MG/DL (ref 0.57–1)
DEPRECATED RDW RBC AUTO: 40.9 FL (ref 37–54)
EGFRCR SERPLBLD CKD-EPI 2021: 114.8 ML/MIN/1.73
EOSINOPHIL # BLD AUTO: 0.26 10*3/MM3 (ref 0–0.4)
EOSINOPHIL NFR BLD AUTO: 2.6 % (ref 0.3–6.2)
ERYTHROCYTE [DISTWIDTH] IN BLOOD BY AUTOMATED COUNT: 12.5 % (ref 12.3–15.4)
GLOBULIN UR ELPH-MCNC: 3.5 GM/DL
GLUCOSE SERPL-MCNC: 81 MG/DL (ref 65–99)
HCT VFR BLD AUTO: 32 % (ref 34–46.6)
HCV AB SER DONR QL: NORMAL
HDLC SERPL-MCNC: 40 MG/DL (ref 40–60)
HGB BLD-MCNC: 10.5 G/DL (ref 12–15.9)
IMM GRANULOCYTES # BLD AUTO: 0.04 10*3/MM3 (ref 0–0.05)
IMM GRANULOCYTES NFR BLD AUTO: 0.4 % (ref 0–0.5)
IRON 24H UR-MRATE: 48 MCG/DL (ref 37–145)
IRON SATN MFR SERPL: 19 % (ref 20–50)
LDLC SERPL CALC-MCNC: 96 MG/DL (ref 0–100)
LDLC/HDLC SERPL: 2.43 {RATIO}
LYMPHOCYTES # BLD AUTO: 1.81 10*3/MM3 (ref 0.7–3.1)
LYMPHOCYTES NFR BLD AUTO: 18.4 % (ref 19.6–45.3)
MCH RBC QN AUTO: 29.6 PG (ref 26.6–33)
MCHC RBC AUTO-ENTMCNC: 32.8 G/DL (ref 31.5–35.7)
MCV RBC AUTO: 90.1 FL (ref 79–97)
MONOCYTES # BLD AUTO: 0.85 10*3/MM3 (ref 0.1–0.9)
MONOCYTES NFR BLD AUTO: 8.6 % (ref 5–12)
NEUTROPHILS NFR BLD AUTO: 6.78 10*3/MM3 (ref 1.7–7)
NEUTROPHILS NFR BLD AUTO: 69 % (ref 42.7–76)
NRBC BLD AUTO-RTO: 0 /100 WBC (ref 0–0.2)
PLATELET # BLD AUTO: 339 10*3/MM3 (ref 140–450)
PMV BLD AUTO: 10.7 FL (ref 6–12)
POTASSIUM SERPL-SCNC: 4.2 MMOL/L (ref 3.5–5.2)
PROT SERPL-MCNC: 7.2 G/DL (ref 6–8.5)
RBC # BLD AUTO: 3.55 10*6/MM3 (ref 3.77–5.28)
SODIUM SERPL-SCNC: 140 MMOL/L (ref 136–145)
TIBC SERPL-MCNC: 249 MCG/DL (ref 298–536)
TRANSFERRIN SERPL-MCNC: 167 MG/DL (ref 200–360)
TRIGL SERPL-MCNC: 50 MG/DL (ref 0–150)
TSH SERPL DL<=0.05 MIU/L-ACNC: 2.12 UIU/ML (ref 0.27–4.2)
VLDLC SERPL-MCNC: 11 MG/DL (ref 5–40)
WBC NRBC COR # BLD: 9.84 10*3/MM3 (ref 3.4–10.8)

## 2022-08-02 PROCEDURE — 84466 ASSAY OF TRANSFERRIN: CPT

## 2022-08-02 PROCEDURE — 86803 HEPATITIS C AB TEST: CPT

## 2022-08-02 PROCEDURE — 80061 LIPID PANEL: CPT

## 2022-08-02 PROCEDURE — 83540 ASSAY OF IRON: CPT

## 2022-08-02 PROCEDURE — 85025 COMPLETE CBC W/AUTO DIFF WBC: CPT

## 2022-08-02 PROCEDURE — 84443 ASSAY THYROID STIM HORMONE: CPT

## 2022-08-02 PROCEDURE — 99214 OFFICE O/P EST MOD 30 MIN: CPT | Performed by: NURSE PRACTITIONER

## 2022-08-02 PROCEDURE — 80053 COMPREHEN METABOLIC PANEL: CPT

## 2022-08-02 PROCEDURE — 36415 COLL VENOUS BLD VENIPUNCTURE: CPT

## 2022-08-02 RX ORDER — FERROUS SULFATE 325(65) MG
325 TABLET ORAL
Qty: 30 TABLET | Refills: 2 | Status: SHIPPED | OUTPATIENT
Start: 2022-08-02 | End: 2022-10-31

## 2022-08-02 RX ORDER — TOPIRAMATE 25 MG/1
25 TABLET ORAL 2 TIMES DAILY
Qty: 60 TABLET | Refills: 2 | Status: SHIPPED | OUTPATIENT
Start: 2022-08-02 | End: 2022-10-31

## 2022-08-02 RX ORDER — ERGOCALCIFEROL (VITAMIN D2) 10 MCG
1 TABLET ORAL DAILY
Qty: 30 TABLET | Refills: 2 | Status: SHIPPED | OUTPATIENT
Start: 2022-08-02 | End: 2022-10-25

## 2022-08-02 RX ORDER — MELOXICAM 15 MG/1
15 TABLET ORAL DAILY
COMMUNITY
Start: 2022-06-09

## 2022-08-02 RX ORDER — ESCITALOPRAM OXALATE 10 MG/1
10 TABLET ORAL DAILY
Qty: 30 TABLET | Refills: 2 | Status: SHIPPED | OUTPATIENT
Start: 2022-08-02 | End: 2022-11-18 | Stop reason: SDUPTHER

## 2022-08-02 NOTE — ASSESSMENT & PLAN NOTE
Patient states that she felt she was doing good on the Lexapro previously, she has been off of it for couple of weeks after running out, we will reinitiate Lexapro today, discussed she may follow-up if she feels like this is not helping, discussed it may take a couple of weeks to get built back up in her system to where she can see the effects.  Highly recommend patient seek grief counseling or support group related to the passing of her .

## 2022-08-02 NOTE — PROGRESS NOTES
Chief Complaint  Migraine (Topamax), Anxiety and Depression (Lexapro), and Anemia (Ferrous Sulfate)    SUBJECTIVE  Sandra Dhillon presents to [facility]       Patient presents today to follow-up on chronic conditions including migraines, anxiety and depression, and anemia.  Patient reports that she has been off of the Lexapro for a couple of weeks because she ran out.  Patient reports that her  passed away a few weeks ago.  States that she feels like she is doing okay with this, has not yet sought any sort of grief counseling.  Patient also complaining of excessive fatigue, states this is been going on since before her  passed, states that she feels so exhausted all the time, states she feels like she can fall asleep standing up.  Her son does report that she snores.  No known history of sleep apnea  Patient states that she had lab evaluation for the fatigue if about 6 months or so ago, the only thing noted was an iron deficiency for which she was placed on iron supplement however this has not helped with her fatigue.  History of Present Illness  Past Medical History:   Diagnosis Date   • Anxiety    • Arthritis    • Depression    • Foot pain, right    • Forgetfulness    • Heel pain    • Hemorrhoids    • Hidradenitis suppurativa    • Ingrown toenail    • Migraines    • Numbness in feet       Family History   Problem Relation Age of Onset   • Cancer Mother         Unspecified   • Cancer Maternal Grandfather         Thyroid pancreatic   • Diabetes Other       Past Surgical History:   Procedure Laterality Date   • D & C CERVICAL BIOPSY     • OTHER SURGICAL HISTORY      Arm surgery   • OTHER SURGICAL HISTORY      Excision, sweat glands, inguinal region   • TUBAL ABDOMINAL LIGATION          Current Outpatient Medications:   •  escitalopram (LEXAPRO) 10 MG tablet, Take 1 tablet by mouth Daily., Disp: 30 tablet, Rfl: 2  •  ferrous sulfate 325 (65 FE) MG tablet, Take 1 tablet by  "mouth Daily With Breakfast., Disp: 30 tablet, Rfl: 2  •  Folic Acid (FOLATE PO), Take  by mouth., Disp: , Rfl:   •  meloxicam (MOBIC) 15 MG tablet, Take 15 mg by mouth Daily., Disp: , Rfl:   •  Prenatal Vit-Fe Fumarate-FA (Prenatal One Daily) 27-0.8 MG tablet, Take 1 each by mouth Daily., Disp: 30 tablet, Rfl: 2  •  silver sulfadiazine (SSD) 1 % cream, Apply  topically to the appropriate area as directed 2 (Two) Times a Day., Disp: 50 g, Rfl: 2  •  topiramate (TOPAMAX) 25 MG tablet, Take 1 tablet by mouth 2 (Two) Times a Day., Disp: 60 tablet, Rfl: 2    OBJECTIVE  Vital Signs:   /91   Pulse 66   Ht 160 cm (63\")   Wt 92.1 kg (203 lb)   SpO2 100%   BMI 35.96 kg/m²    Estimated body mass index is 35.96 kg/m² as calculated from the following:    Height as of this encounter: 160 cm (63\").    Weight as of this encounter: 92.1 kg (203 lb).     Wt Readings from Last 3 Encounters:   08/02/22 92.1 kg (203 lb)   03/15/22 90.9 kg (200 lb 6.4 oz)   10/29/21 76.9 kg (169 lb 8.5 oz)     BP Readings from Last 3 Encounters:   08/02/22 126/91   03/15/22 131/74   10/29/21 141/73     BP re-check 120/72     Physical Exam  Vitals reviewed.   Constitutional:       Appearance: Normal appearance. She is well-developed.   HENT:      Head: Normocephalic and atraumatic.      Right Ear: External ear normal.      Left Ear: External ear normal.   Eyes:      Conjunctiva/sclera: Conjunctivae normal.      Pupils: Pupils are equal, round, and reactive to light.   Cardiovascular:      Rate and Rhythm: Normal rate and regular rhythm.      Heart sounds: No murmur heard.    No friction rub. No gallop.   Pulmonary:      Effort: Pulmonary effort is normal.      Breath sounds: Normal breath sounds. No wheezing or rhonchi.   Skin:     General: Skin is warm and dry.   Neurological:      Mental Status: She is alert and oriented to person, place, and time.      Cranial Nerves: No cranial nerve deficit.   Psychiatric:         Mood and Affect: Mood and " affect normal.         Behavior: Behavior normal.         Thought Content: Thought content normal.         Judgment: Judgment normal.      Comments: Patient is tearful when discussing her 's passing but is able to recover          Result Review    CMP    CMP 9/9/21 10/29/21   Glucose 75 97   BUN 18 6   Creatinine 0.78 0.78   eGFR Non African Am 81 81   Sodium 136 137   Potassium 4.7 3.3 (A)   Chloride 100 103   Calcium 9.3 9.0   Albumin 3.70 4.30   Total Bilirubin <0.2 0.2   Alkaline Phosphatase 129 (A) 140 (A)   AST (SGOT) 16 11   ALT (SGPT) 16 8   (A) Abnormal value            CBC    CBC 9/9/21 10/29/21   WBC 9.60 12.12 (A)   RBC 3.71 (A) 4.12   Hemoglobin 10.8 (A) 12.4   Hematocrit 34.9 37.5   MCV 94.1 91.0   MCH 29.1 30.1   MCHC 30.9 (A) 33.1   RDW 13.1 13.2   Platelets 321 361   (A) Abnormal value                TSH    TSH 9/9/21   TSH 2.950                 No Images in the past 120 days found..     The above data has been reviewed by MEME Child 08/02/2022 10:29 EDT.          Patient Care Team:  Veronica Liang APRN as PCP - General (Nurse Practitioner)         ASSESSMENT & PLAN    Diagnoses and all orders for this visit:    1. Depression, unspecified depression type (Primary)  Comments:  uncontrolled due to being out of lexapro x 1m-refilled today.   Assessment & Plan:  Patient states that she felt she was doing good on the Lexapro previously, she has been off of it for couple of weeks after running out, we will reinitiate Lexapro today, discussed she may follow-up if she feels like this is not helping, discussed it may take a couple of weeks to get built back up in her system to where she can see the effects.  Highly recommend patient seek grief counseling or support group related to the passing of her .    Orders:  -     escitalopram (LEXAPRO) 10 MG tablet; Take 1 tablet by mouth Daily.  Dispense: 30 tablet; Refill: 2    2. Anxiety  -     escitalopram (LEXAPRO) 10 MG tablet;  Take 1 tablet by mouth Daily.  Dispense: 30 tablet; Refill: 2    3. Iron deficiency anemia, unspecified iron deficiency anemia type  Comments:  she has been taking an otc iron supplemement but admits fatigue-sent in ferrous sulfate 325mg qd.   Overview:  Well-controlled with ferrous sulfate, continue current dose, labs today    Orders:  -     ferrous sulfate 325 (65 FE) MG tablet; Take 1 tablet by mouth Daily With Breakfast.  Dispense: 30 tablet; Refill: 2  -     Prenatal Vit-Fe Fumarate-FA (Prenatal One Daily) 27-0.8 MG tablet; Take 1 each by mouth Daily.  Dispense: 30 tablet; Refill: 2  -     Iron Profile; Future    4. Other migraine without status migrainosus, not intractable  Assessment & Plan:  Well-controlled with Topamax, continue current dose      Orders:  -     topiramate (TOPAMAX) 25 MG tablet; Take 1 tablet by mouth 2 (Two) Times a Day.  Dispense: 60 tablet; Refill: 2    5. Routine lab draw  -     CBC w AUTO Differential; Future  -     Comprehensive metabolic panel; Future  -     Lipid panel; Future  -     TSH; Future  -     Iron Profile; Future    6. Screening for lipid disorders  -     Lipid panel; Future    7. Screening for thyroid disorder  -     TSH; Future    8. Need for hepatitis C screening test  -     Hepatitis C antibody; Future    9. Breast cancer screening by mammogram  -     Mammo Screening Digital Tomosynthesis Bilateral With CAD; Future    10. Has daytime drowsiness  -     Ambulatory Referral to Sleep Medicine    11. Snoring  -     Ambulatory Referral to Sleep Medicine       Tobacco Use: Medium Risk   • Smoking Tobacco Use: Former Smoker   • Smokeless Tobacco Use: Never Used       Follow Up     Return in about 6 months (around 2/2/2023).        Patient was given instructions and counseling regarding her condition or for health maintenance advice. Please see specific information pulled into the AVS if appropriate.   I have reviewed information obtained and documented by others and I have  confirmed the accuracy of this documented note.    Veronica Liang, APRN

## 2022-08-03 DIAGNOSIS — R71.0 DECREASED HEMOGLOBIN: Primary | ICD-10-CM

## 2022-09-14 ENCOUNTER — HOSPITAL ENCOUNTER (OUTPATIENT)
Dept: MAMMOGRAPHY | Facility: HOSPITAL | Age: 42
Discharge: HOME | End: 2022-09-14
Admitting: NURSE PRACTITIONER

## 2022-09-14 DIAGNOSIS — Z12.31 BREAST CANCER SCREENING BY MAMMOGRAM: ICD-10-CM

## 2022-09-14 PROCEDURE — 77067 SCR MAMMO BI INCL CAD: CPT

## 2022-09-14 PROCEDURE — 77063 BREAST TOMOSYNTHESIS BI: CPT

## 2022-09-16 ENCOUNTER — TELEPHONE (OUTPATIENT)
Dept: FAMILY MEDICINE CLINIC | Facility: CLINIC | Age: 42
End: 2022-09-16

## 2022-10-24 DIAGNOSIS — D50.9 IRON DEFICIENCY ANEMIA, UNSPECIFIED IRON DEFICIENCY ANEMIA TYPE: ICD-10-CM

## 2022-10-25 RX ORDER — PNV,CALCIUM 72/IRON/FOLIC ACID 27 MG-1 MG
TABLET ORAL
Qty: 30 TABLET | Refills: 4 | Status: SHIPPED | OUTPATIENT
Start: 2022-10-25 | End: 2023-03-17

## 2022-10-27 ENCOUNTER — OFFICE VISIT (OUTPATIENT)
Dept: SLEEP MEDICINE | Facility: HOSPITAL | Age: 42
End: 2022-10-27

## 2022-10-27 VITALS
HEART RATE: 79 BPM | BODY MASS INDEX: 34.37 KG/M2 | WEIGHT: 201.3 LBS | OXYGEN SATURATION: 100 % | DIASTOLIC BLOOD PRESSURE: 76 MMHG | SYSTOLIC BLOOD PRESSURE: 108 MMHG | HEIGHT: 64 IN

## 2022-10-27 DIAGNOSIS — G47.26 CIRCADIAN RHYTHM SLEEP DISORDER, SHIFT WORK TYPE: ICD-10-CM

## 2022-10-27 DIAGNOSIS — G47.8 NON-RESTORATIVE SLEEP: ICD-10-CM

## 2022-10-27 DIAGNOSIS — E66.9 OBESITY (BMI 30-39.9): ICD-10-CM

## 2022-10-27 DIAGNOSIS — R06.83 SNORING: ICD-10-CM

## 2022-10-27 DIAGNOSIS — G47.30 SLEEP APNEA, UNSPECIFIED TYPE: Primary | ICD-10-CM

## 2022-10-27 DIAGNOSIS — D50.9 IRON DEFICIENCY ANEMIA, UNSPECIFIED IRON DEFICIENCY ANEMIA TYPE: ICD-10-CM

## 2022-10-27 DIAGNOSIS — G43.809 OTHER MIGRAINE WITHOUT STATUS MIGRAINOSUS, NOT INTRACTABLE: ICD-10-CM

## 2022-10-27 DIAGNOSIS — G47.10 HYPERSOMNIA: ICD-10-CM

## 2022-10-27 PROCEDURE — G0463 HOSPITAL OUTPT CLINIC VISIT: HCPCS | Performed by: FAMILY MEDICINE

## 2022-10-27 PROCEDURE — 99204 OFFICE O/P NEW MOD 45 MIN: CPT | Performed by: FAMILY MEDICINE

## 2022-10-27 RX ORDER — TEMAZEPAM 7.5 MG/1
CAPSULE ORAL
Qty: 2 CAPSULE | Refills: 0 | Status: SHIPPED | OUTPATIENT
Start: 2022-10-27

## 2022-10-27 NOTE — PROGRESS NOTES
[facility]Consultation       Reason for Consultation: Snoring daytime drowsiness      Patient Care Team:  Veronica Liang APRN as PCP - General (Nurse Practitioner)  Noemi Restrepo MD as Consulting Physician (Sleep Medicine)      History of present illness:  Thank you for asking me to see your patient.  The patient is a 42 y.o. female with anxiety depression migraines arthritis presents today with concern for sleep disorder.  No history of prior sleep study or tonsillectomy.  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  Patient reports hypersomnia nonrestorative sleep avoid driving due to sleepiness leaking over the past 5 years snoring waking up gasping for breath morning headaches waking with dry mouth pain disrupting sleep sweating excessively during sleep sudden episodes of sleep during the day.  BMI 35.1.    Bedtime weekdays 11-12 sleep 8 to 10 minutes wake time 6 AM sleeps around 6 hours 1 nap per day weekends bedtime 8 PM to 9 PM wake time 3 AM sleep latency 10 minutes 1 nap per day.      Social History: 1 pack of cigarettes a day 1-2 alcoholic drinks per month 2 sodas a day    Allergies:  Patient has no known allergies.    Family History: CHRIS no       Current Outpatient Medications:   •  escitalopram (LEXAPRO) 10 MG tablet, Take 1 tablet by mouth Daily., Disp: 30 tablet, Rfl: 2  •  FeroSul 325 (65 Fe) MG tablet, TAKE 1 TABLET BY MOUTH DAILY WITH BREAKFAST, Disp: 90 tablet, Rfl: 1  •  Folic Acid (FOLATE PO), Take  by mouth., Disp: , Rfl:   •  meloxicam (MOBIC) 15 MG tablet, Take 15 mg by mouth Daily., Disp: , Rfl:   •  Prenatal Vit-Fe Fumarate-FA (WesTab Plus) 27-1 MG tablet, TAKE 1 TABLET BY MOUTH EVERY DAY, Disp: 30 tablet, Rfl: 4  •  silver sulfadiazine (SSD) 1 % cream, Apply  topically to the appropriate area as directed 2 (Two) Times a Day., Disp: 50 g, Rfl: 2  •  temazepam (RESTORIL) 7.5 MG capsule, Take 1 tab on night of sleep study as needed for sleep.  "Do not use at home., Disp: 2 capsule, Rfl: 0  •  topiramate (TOPAMAX) 25 MG tablet, TAKE 1 TABLET BY MOUTH TWICE DAILY, Disp: 180 tablet, Rfl: 1    Vital Signs:    Vitals:    10/27/22 1100   BP: 108/76   Pulse: 79   SpO2: 100%   Weight: 91.3 kg (201 lb 4.8 oz)   Height: 161.3 cm (63.5\")      Body mass index is 35.1 kg/m².  Neck Circumference: 13.5 inches      REVIEW OF SYSTEMS.  Full review of systems available on the intake form which is scanned in the media tab.  The relevant positive are noted below  1. Daytime excessive sleepiness with [place] Sleepiness Scale :Total score: 23   2. Snoring  3. Cough frequent urination anxiety depression frequent heartburn      Physical exam:  Vitals:    10/27/22 1100   BP: 108/76   Pulse: 79   SpO2: 100%   Weight: 91.3 kg (201 lb 4.8 oz)   Height: 161.3 cm (63.5\")    Body mass index is 35.1 kg/m². Neck Circumference: 13.5 inches  HEENT: Head is atraumatic, normocephalic  Eyes: pupils are round equal and reacting to light and accommodation, conjunctiva normal  NECK:Neck Circumference: 13.5 inches  RESPIRATORY SYSTEM: Regular respirations  CARDIOVASULAR SYSTEM: Regular rate  EXTREMITES: No cyanosis, clubbing  NEUROLOGICAL SYSTEM: Oriented x 3, no gross motor defects, gait normal      Impression:  1. Sleep apnea, unspecified type    2. Hypersomnia    3. Non-restorative sleep    4. Obesity (BMI 30-39.9)    5. Snoring    6. Circadian rhythm sleep disorder, shift work type        Plan:    Good sleep hygiene measures should be maintained.  Weight loss would be beneficial in this patient who is obese Lauro 35.1.    I discussed the pathophysiology of obstructive sleep apnea with the patient.  We discussed the adverse outcomes associated with untreated sleep-disordered breathing.  We discussed treatment modalities of obstructive sleep apnea including CPAP device as well as oral mandibular advancement device. Sleep study will be scheduled to establish definitive diagnosis of sleep disorder " breathing.  Weight loss will be strongly beneficial in order to reduce the severity of sleep-disordered breathing.  Caution during activities that require prolonged concentration is strongly advised.  Patient will be notified of sleep study results after sleep study is completed.  If sleep apnea is only mild,  oral mandibular advancement device may be one of the treatment options.  However if sleep apnea is moderately severe, CPAP treatment will be strongly encouraged.  The patient is not opposed to treatment with CPAP device if we confirm significant obstructive sleep apnea on polysomnography. Prescription for Temazepam was provided to bring to the Sleep lab to improve sleep efficiency.  Patient was asked to not take the Temazepam at home.    If negative split study will order MSLT due to concern for significant hypersomnia and non restorative sleep and reported sudden episodes of sleep during the day.    Thank you for allowing me to participate in your patient's care.    Noemi Restrepo MD  Sleep Medicine  01/24/23  10:21 EST

## 2022-10-31 RX ORDER — FERROUS SULFATE 325(65) MG
TABLET ORAL
Qty: 30 TABLET | Refills: 2 | Status: SHIPPED | OUTPATIENT
Start: 2022-10-31 | End: 2023-01-16

## 2022-10-31 RX ORDER — TOPIRAMATE 25 MG/1
TABLET ORAL
Qty: 60 TABLET | Refills: 2 | Status: SHIPPED | OUTPATIENT
Start: 2022-10-31 | End: 2023-01-16

## 2022-11-18 DIAGNOSIS — F32.A DEPRESSION, UNSPECIFIED DEPRESSION TYPE: ICD-10-CM

## 2022-11-18 DIAGNOSIS — F41.9 ANXIETY: ICD-10-CM

## 2022-11-18 NOTE — TELEPHONE ENCOUNTER
Caller: Sandra Dhillon    Relationship: Self    Best call back number: [phone]    Requested Prescriptions:   Requested Prescriptions     Pending Prescriptions Disp Refills   • escitalopram (LEXAPRO) 10 MG tablet 30 tablet 2     Sig: Take 1 tablet by mouth Daily.        Pharm[facility] where [facility] be sent[id number] [address] AT [facility] [address]  [phone] [facility] [phone] FX     Additional details provided by patient: PATIENT WAS ADVISED BY PHARMACY THEY HAD SENT A REFILL REQUEST WITH NO RESPONSE.  PATIENT HAS BEEN OUT OF THIS MEDICATION FOR ABOUT TWO WEEKS PLEASE CONTACT IF SHE IS UNABLE TO GET THIS REFILL    Does the patient have less than a 3 day supply:  [x] Yes  [] No    Amanda Bolden Rep   11/18/22 12:17 EST

## 2022-11-21 RX ORDER — ESCITALOPRAM OXALATE 10 MG/1
10 TABLET ORAL DAILY
Qty: 30 TABLET | Refills: 2 | Status: SHIPPED | OUTPATIENT
Start: 2022-11-21 | End: 2023-02-02 | Stop reason: SDUPTHER

## 2022-11-27 ENCOUNTER — HOSPITAL ENCOUNTER (EMERGENCY)
Facility: HOSPITAL | Age: 42
Discharge: HOME | End: 2022-11-27
Attending: EMERGENCY MEDICINE | Admitting: EMERGENCY MEDICINE

## 2022-11-27 ENCOUNTER — APPOINTMENT (OUTPATIENT)
Dept: GENERAL RADIOLOGY | Facility: HOSPITAL | Age: 42
End: 2022-11-27

## 2022-11-27 VITALS
WEIGHT: 200.4 LBS | RESPIRATION RATE: 16 BRPM | DIASTOLIC BLOOD PRESSURE: 75 MMHG | HEIGHT: 64 IN | SYSTOLIC BLOOD PRESSURE: 105 MMHG | TEMPERATURE: 97.7 F | BODY MASS INDEX: 34.21 KG/M2 | OXYGEN SATURATION: 100 % | HEART RATE: 79 BPM

## 2022-11-27 DIAGNOSIS — J06.9 UPPER RESPIRATORY TRACT INFECTION, UNSPECIFIED TYPE: Primary | ICD-10-CM

## 2022-11-27 LAB
FLUAV AG NPH QL: NEGATIVE
FLUBV AG NPH QL IA: NEGATIVE
SARS-COV-2 RNA PNL SPEC NAA+PROBE: NOT DETECTED

## 2022-11-27 PROCEDURE — 71045 X-RAY EXAM CHEST 1 VIEW: CPT

## 2022-11-27 PROCEDURE — C9803 HOPD COVID-19 SPEC COLLECT: HCPCS | Performed by: EMERGENCY MEDICINE

## 2022-11-27 PROCEDURE — 87804 INFLUENZA ASSAY W/OPTIC: CPT

## 2022-11-27 PROCEDURE — 99283 EMERGENCY DEPT VISIT LOW MDM: CPT

## 2022-11-27 PROCEDURE — U0004 COV-19 TEST NON-CDC HGH THRU: HCPCS

## 2022-11-27 RX ORDER — IBUPROFEN 400 MG/1
800 TABLET ORAL ONCE
Status: COMPLETED | OUTPATIENT
Start: 2022-11-27 | End: 2022-11-27

## 2022-11-27 RX ADMIN — IBUPROFEN 800 MG: 400 TABLET, FILM COATED ORAL at 18:25

## 2022-12-04 ENCOUNTER — HOSPITAL ENCOUNTER (OUTPATIENT)
Dept: SLEEP MEDICINE | Facility: HOSPITAL | Age: 42
Discharge: HOME | End: 2022-12-04
Admitting: FAMILY MEDICINE

## 2022-12-04 DIAGNOSIS — G47.26 CIRCADIAN RHYTHM SLEEP DISORDER, SHIFT WORK TYPE: ICD-10-CM

## 2022-12-04 DIAGNOSIS — G47.8 NON-RESTORATIVE SLEEP: ICD-10-CM

## 2022-12-04 DIAGNOSIS — E66.9 OBESITY (BMI 30-39.9): ICD-10-CM

## 2022-12-04 DIAGNOSIS — G47.10 HYPERSOMNIA: ICD-10-CM

## 2022-12-04 DIAGNOSIS — R06.83 SNORING: ICD-10-CM

## 2022-12-04 DIAGNOSIS — G47.30 SLEEP APNEA, UNSPECIFIED TYPE: ICD-10-CM

## 2022-12-04 PROCEDURE — 95810 POLYSOM 6/> YRS 4/> PARAM: CPT

## 2022-12-04 PROCEDURE — 95810 POLYSOM 6/> YRS 4/> PARAM: CPT | Performed by: FAMILY MEDICINE

## 2022-12-12 DIAGNOSIS — G47.10 HYPERSOMNIA: Primary | ICD-10-CM

## 2022-12-12 DIAGNOSIS — R06.83 SNORING: ICD-10-CM

## 2022-12-12 DIAGNOSIS — G47.26 CIRCADIAN RHYTHM SLEEP DISORDER, SHIFT WORK TYPE: ICD-10-CM

## 2022-12-12 DIAGNOSIS — G47.8 NON-RESTORATIVE SLEEP: ICD-10-CM

## 2022-12-12 DIAGNOSIS — E66.9 OBESITY (BMI 30-39.9): ICD-10-CM

## 2023-01-14 DIAGNOSIS — D50.9 IRON DEFICIENCY ANEMIA, UNSPECIFIED IRON DEFICIENCY ANEMIA TYPE: ICD-10-CM

## 2023-01-14 DIAGNOSIS — G43.809 OTHER MIGRAINE WITHOUT STATUS MIGRAINOSUS, NOT INTRACTABLE: ICD-10-CM

## 2023-01-16 RX ORDER — FERROUS SULFATE 325(65) MG
TABLET ORAL
Qty: 90 TABLET | Refills: 1 | Status: SHIPPED | OUTPATIENT
Start: 2023-01-16

## 2023-01-16 RX ORDER — TOPIRAMATE 25 MG/1
TABLET ORAL
Qty: 180 TABLET | Refills: 1 | Status: SHIPPED | OUTPATIENT
Start: 2023-01-16 | End: 2023-02-02

## 2023-02-02 ENCOUNTER — OFFICE VISIT (OUTPATIENT)
Dept: FAMILY MEDICINE CLINIC | Facility: CLINIC | Age: 43
End: 2023-02-02
Payer: COMMERCIAL

## 2023-02-02 ENCOUNTER — LAB (OUTPATIENT)
Dept: LAB | Facility: HOSPITAL | Age: 43
End: 2023-02-02
Payer: COMMERCIAL

## 2023-02-02 VITALS
WEIGHT: 196 LBS | HEIGHT: 64 IN | BODY MASS INDEX: 33.46 KG/M2 | SYSTOLIC BLOOD PRESSURE: 114 MMHG | HEART RATE: 87 BPM | DIASTOLIC BLOOD PRESSURE: 76 MMHG | OXYGEN SATURATION: 100 %

## 2023-02-02 DIAGNOSIS — F32.A DEPRESSION, UNSPECIFIED DEPRESSION TYPE: ICD-10-CM

## 2023-02-02 DIAGNOSIS — M19.90 OSTEOARTHRITIS, UNSPECIFIED OSTEOARTHRITIS TYPE, UNSPECIFIED SITE: ICD-10-CM

## 2023-02-02 DIAGNOSIS — M19.90 OSTEOARTHRITIS, UNSPECIFIED OSTEOARTHRITIS TYPE, UNSPECIFIED SITE: Primary | ICD-10-CM

## 2023-02-02 DIAGNOSIS — Z79.899 ENCOUNTER FOR LONG-TERM (CURRENT) USE OF MEDICATIONS: ICD-10-CM

## 2023-02-02 DIAGNOSIS — D50.9 IRON DEFICIENCY ANEMIA, UNSPECIFIED IRON DEFICIENCY ANEMIA TYPE: ICD-10-CM

## 2023-02-02 DIAGNOSIS — E66.9 CLASS 1 OBESITY WITH BODY MASS INDEX (BMI) OF 33.0 TO 33.9 IN ADULT, UNSPECIFIED OBESITY TYPE, UNSPECIFIED WHETHER SERIOUS COMORBIDITY PRESENT: ICD-10-CM

## 2023-02-02 DIAGNOSIS — F41.9 ANXIETY: ICD-10-CM

## 2023-02-02 DIAGNOSIS — G43.809 OTHER MIGRAINE WITHOUT STATUS MIGRAINOSUS, NOT INTRACTABLE: ICD-10-CM

## 2023-02-02 PROBLEM — E66.811 CLASS 1 OBESITY WITH BODY MASS INDEX (BMI) OF 33.0 TO 33.9 IN ADULT: Status: ACTIVE | Noted: 2023-02-02

## 2023-02-02 LAB
ALBUMIN SERPL-MCNC: 4 G/DL (ref 3.5–5.2)
ALBUMIN/GLOB SERPL: 1.2 G/DL
ALP SERPL-CCNC: 129 U/L (ref 39–117)
ALT SERPL W P-5'-P-CCNC: 16 U/L (ref 1–33)
ANION GAP SERPL CALCULATED.3IONS-SCNC: 8 MMOL/L (ref 5–15)
AST SERPL-CCNC: 19 U/L (ref 1–32)
BASOPHILS # BLD AUTO: 0.12 10*3/MM3 (ref 0–0.2)
BASOPHILS NFR BLD AUTO: 1.1 % (ref 0–1.5)
BILIRUB SERPL-MCNC: 0.3 MG/DL (ref 0–1.2)
BUN SERPL-MCNC: 4 MG/DL (ref 6–20)
BUN/CREAT SERPL: 7 (ref 7–25)
CALCIUM SPEC-SCNC: 9.4 MG/DL (ref 8.6–10.5)
CHLORIDE SERPL-SCNC: 102 MMOL/L (ref 98–107)
CO2 SERPL-SCNC: 29 MMOL/L (ref 22–29)
CREAT SERPL-MCNC: 0.57 MG/DL (ref 0.57–1)
DEPRECATED RDW RBC AUTO: 40.4 FL (ref 37–54)
EGFRCR SERPLBLD CKD-EPI 2021: 115.1 ML/MIN/1.73
EOSINOPHIL # BLD AUTO: 0.24 10*3/MM3 (ref 0–0.4)
EOSINOPHIL NFR BLD AUTO: 2.2 % (ref 0.3–6.2)
ERYTHROCYTE [DISTWIDTH] IN BLOOD BY AUTOMATED COUNT: 12.6 % (ref 12.3–15.4)
GLOBULIN UR ELPH-MCNC: 3.3 GM/DL
GLUCOSE SERPL-MCNC: 86 MG/DL (ref 65–99)
HCT VFR BLD AUTO: 36.9 % (ref 34–46.6)
HGB BLD-MCNC: 12.1 G/DL (ref 12–15.9)
IMM GRANULOCYTES # BLD AUTO: 0.04 10*3/MM3 (ref 0–0.05)
IMM GRANULOCYTES NFR BLD AUTO: 0.4 % (ref 0–0.5)
LYMPHOCYTES # BLD AUTO: 1.98 10*3/MM3 (ref 0.7–3.1)
LYMPHOCYTES NFR BLD AUTO: 18.5 % (ref 19.6–45.3)
MCH RBC QN AUTO: 29.3 PG (ref 26.6–33)
MCHC RBC AUTO-ENTMCNC: 32.8 G/DL (ref 31.5–35.7)
MCV RBC AUTO: 89.3 FL (ref 79–97)
MONOCYTES # BLD AUTO: 0.81 10*3/MM3 (ref 0.1–0.9)
MONOCYTES NFR BLD AUTO: 7.5 % (ref 5–12)
NEUTROPHILS NFR BLD AUTO: 7.54 10*3/MM3 (ref 1.7–7)
NEUTROPHILS NFR BLD AUTO: 70.3 % (ref 42.7–76)
NRBC BLD AUTO-RTO: 0 /100 WBC (ref 0–0.2)
PLATELET # BLD AUTO: 362 10*3/MM3 (ref 140–450)
PMV BLD AUTO: 11.3 FL (ref 6–12)
POTASSIUM SERPL-SCNC: 3.9 MMOL/L (ref 3.5–5.2)
PROT SERPL-MCNC: 7.3 G/DL (ref 6–8.5)
RBC # BLD AUTO: 4.13 10*6/MM3 (ref 3.77–5.28)
SODIUM SERPL-SCNC: 139 MMOL/L (ref 136–145)
WBC NRBC COR # BLD: 10.73 10*3/MM3 (ref 3.4–10.8)

## 2023-02-02 PROCEDURE — 85025 COMPLETE CBC W/AUTO DIFF WBC: CPT

## 2023-02-02 PROCEDURE — 99214 OFFICE O/P EST MOD 30 MIN: CPT | Performed by: NURSE PRACTITIONER

## 2023-02-02 PROCEDURE — 36415 COLL VENOUS BLD VENIPUNCTURE: CPT

## 2023-02-02 PROCEDURE — 80053 COMPREHEN METABOLIC PANEL: CPT

## 2023-02-02 RX ORDER — TOPIRAMATE 50 MG/1
50 TABLET, FILM COATED ORAL 2 TIMES DAILY
Qty: 180 TABLET | Refills: 1 | Status: SHIPPED | OUTPATIENT
Start: 2023-02-02

## 2023-02-02 RX ORDER — ESCITALOPRAM OXALATE 10 MG/1
10 TABLET ORAL DAILY
Qty: 90 TABLET | Refills: 1 | Status: SHIPPED | OUTPATIENT
Start: 2023-02-02

## 2023-02-02 RX ORDER — CELECOXIB 100 MG/1
100 CAPSULE ORAL 2 TIMES DAILY PRN
Qty: 180 CAPSULE | Refills: 1 | Status: SHIPPED | OUTPATIENT
Start: 2023-02-02

## 2023-02-02 NOTE — PROGRESS NOTES
Chief Complaint  Depression, Migraine, and Joint Pain    SUBJECTIVE  Sandra Dhillon presents to [facility] 6 month follow up    Pt states she is having joint pain from her low back down to her feet.  Stands all day for work, states knees and hips are the worst.    Pt has been taking Mobic 15mg with little to no relief, pt would like to discuss other options.    Mammo: 09/14/22          History of Present Illness  Past Medical History:   Diagnosis Date   • Anxiety    • Arthritis    • Depression    • Foot pain, right    • Forgetfulness    • Heel pain    • Hemorrhoids    • Hidradenitis suppurativa    • Ingrown toenail    • Migraines    • Numbness in feet       Family History   Problem Relation Age of Onset   • Cancer Mother         Unspecified   • Cancer Maternal Grandfather         Thyroid pancreatic   • Diabetes Other       Past Surgical History:   Procedure Laterality Date   • D & C CERVICAL BIOPSY     • OTHER SURGICAL HISTORY      Arm surgery   • OTHER SURGICAL HISTORY      Excision, sweat glands, inguinal region   • TUBAL ABDOMINAL LIGATION          Current Outpatient Medications:   •  escitalopram (LEXAPRO) 10 MG tablet, Take 1 tablet by mouth Daily., Disp: 90 tablet, Rfl: 1  •  FeroSul 325 (65 Fe) MG tablet, TAKE 1 TABLET BY MOUTH DAILY WITH BREAKFAST, Disp: 90 tablet, Rfl: 1  •  Folic Acid (FOLATE PO), Take  by mouth., Disp: , Rfl:   •  meloxicam (MOBIC) 15 MG tablet, Take 15 mg by mouth Daily., Disp: , Rfl:   •  Prenatal Vit-Fe Fumarate-FA (WesTab Plus) 27-1 MG tablet, TAKE 1 TABLET BY MOUTH EVERY DAY, Disp: 30 tablet, Rfl: 4  •  celecoxib (CeleBREX) 100 MG capsule, Take 1 capsule by mouth 2 (Two) Times a Day As Needed for Mild Pain., Disp: 180 capsule, Rfl: 1  •  silver sulfadiazine (SSD) 1 % cream, Apply  topically to the appropriate area as directed 2 (Two) Times a Day., Disp: 50 g, Rfl: 2  •  temazepam (RESTORIL) 7.5 MG capsule, Take 1 tab on night of sleep study as needed  "for sleep. Do not use at home., Disp: 2 capsule, Rfl: 0  •  topiramate (Topamax) 50 MG tablet, Take 1 tablet by mouth 2 (Two) Times a Day., Disp: 180 tablet, Rfl: 1    OBJECTIVE  Vital Signs:   /76   Pulse 87   Ht 162.6 cm (64\")   Wt 88.9 kg (196 lb)   SpO2 100%   BMI 33.64 kg/m²    Estimated body mass index is 33.64 kg/m² as calculated from the following:    Height as of this encounter: 162.6 cm (64\").    Weight as of this encounter: 88.9 kg (196 lb).     Wt Readings from Last 3 Encounters:   02/02/23 88.9 kg (196 lb)   11/27/22 90.9 kg (200 lb 6.4 oz)   10/27/22 91.3 kg (201 lb 4.8 oz)     BP Readings from Last 3 Encounters:   02/02/23 114/76   11/27/22 105/75   10/27/22 108/76       Physical Exam  Vitals reviewed.   Constitutional:       Appearance: Normal appearance. She is well-developed.   HENT:      Head: Normocephalic and atraumatic.      Right Ear: External ear normal.      Left Ear: External ear normal.   Eyes:      Conjunctiva/sclera: Conjunctivae normal.      Pupils: Pupils are equal, round, and reactive to light.   Cardiovascular:      Rate and Rhythm: Normal rate and regular rhythm.      Heart sounds: No murmur heard.    No friction rub. No gallop.   Pulmonary:      Effort: Pulmonary effort is normal.      Breath sounds: Normal breath sounds. No wheezing or rhonchi.   Skin:     General: Skin is warm and dry.   Neurological:      Mental Status: She is alert and oriented to person, place, and time.      Cranial Nerves: No cranial nerve deficit.   Psychiatric:         Mood and Affect: Mood and affect normal.         Behavior: Behavior normal.         Thought Content: Thought content normal.         Judgment: Judgment normal.          Result Review    CMP    CMP 8/2/22   Glucose 81   BUN 9   Creatinine 0.59   eGFR 114.8   Sodium 140   Potassium 4.2   Chloride 104   Calcium 8.8   Total Protein 7.2   Albumin 3.70   Globulin 3.5   Total Bilirubin 0.3   Alkaline Phosphatase 114   AST (SGOT) 15   ALT " (SGPT) 10   Albumin/Globulin Ratio 1.1   BUN/Creatinine Ratio 15.3   Anion Gap 10.0      Comments are available for some flowsheets but are not being displayed.           CBC    CBC 8/2/22   WBC 9.84   RBC 3.55 (A)   Hemoglobin 10.5 (A)   Hematocrit 32.0 (A)   MCV 90.1   MCH 29.6   MCHC 32.8   RDW 12.5   Platelets 339   (A) Abnormal value            Lipid Panel    Lipid Panel 8/2/22   Total Cholesterol 147   Triglycerides 50   HDL Cholesterol 40   VLDL Cholesterol 11   LDL Cholesterol  96   LDL/HDL Ratio 2.43           TSH    TSH 8/2/22   TSH 2.120                 XR Chest 1 View    Result Date: 11/27/2022   No active cardiopulmonary disease.       NENA TENA DO       Electronically Signed and Approved By: NENA TENA DO on 11/27/2022 at 18:39                The above data has been reviewed by MEME Child 02/02/2023 09:36 EST.          Patient Care Team:  Veronica Liang APRN as PCP - General (Nurse Practitioner)    BMI is >= 30 and <35. (Class 1 Obesity). The following options were offered after discussion;: exercise counseling/recommendations and nutrition counseling/recommendations       ASSESSMENT & PLAN    Diagnoses and all orders for this visit:    1. Osteoarthritis, unspecified osteoarthritis type, unspecified site (Primary)  Assessment & Plan:  Discussed weight loss would help with hip and knee pain, we will trial Celebrex in place of meloxicam, side effects and admin discussed.    Orders:  -     celecoxib (CeleBREX) 100 MG capsule; Take 1 capsule by mouth 2 (Two) Times a Day As Needed for Mild Pain.  Dispense: 180 capsule; Refill: 1  -     Comprehensive metabolic panel; Future    2. Depression, unspecified depression type  Assessment & Plan:  Well-controlled Lexapro, continue current dose    Orders:  -     escitalopram (LEXAPRO) 10 MG tablet; Take 1 tablet by mouth Daily.  Dispense: 90 tablet; Refill: 1    3. Anxiety  Assessment & Plan:  Well-controlled Lexapro, continue current  dose    Orders:  -     escitalopram (LEXAPRO) 10 MG tablet; Take 1 tablet by mouth Daily.  Dispense: 90 tablet; Refill: 1    4. Other migraine without status migrainosus, not intractable  Assessment & Plan:  Well-controlled with Topamax, however patient is struggling with weight loss and having worsening arthritic pain related to her weight, we are going to go ahead and increase her dose of Topamax to try to help with appetite suppression.  Patient states was previously on 50 mg for migraines and tolerated well and was able to lose some weight.      Orders:  -     topiramate (Topamax) 50 MG tablet; Take 1 tablet by mouth 2 (Two) Times a Day.  Dispense: 180 tablet; Refill: 1    5. Iron deficiency anemia, unspecified iron deficiency anemia type  Overview:  Well-controlled with ferrous sulfate, continue current dose, labs today    Orders:  -     Occult Blood, Fecal By Immunoassay - Stool, Per Rectum; Future  -     Occult Blood, Fecal By Immunoassay - Stool, Per Rectum; Future  -     Occult Blood, Fecal By Immunoassay - Stool, Per Rectum; Future  -     CBC w AUTO Differential; Future    6. Encounter for long-term (current) use of medications  -     CBC w AUTO Differential; Future  -     Comprehensive metabolic panel; Future    7. Class 1 obesity with body mass index (BMI) of 33.0 to 33.9 in adult, unspecified obesity type, unspecified whether serious comorbidity present  Assessment & Plan:  We are increasing patient's dose of Topamax to help with appetite suppression and patient is going to work on a diet and exercise to help with weight loss.  We will follow-up in 6 months         Tobacco Use: Medium Risk   • Smoking Tobacco Use: Former   • Smokeless Tobacco Use: Never   • Passive Exposure: Not on file       Follow Up     Return in about 6 months (around 8/2/2023).        Patient was given instructions and counseling regarding her condition or for health maintenance advice. Please see specific information pulled into  the AVS if appropriate.   I have reviewed information obtained and documented by others and I have confirmed the accuracy of this documented note.    Veronica Liang APRN

## 2023-02-02 NOTE — ASSESSMENT & PLAN NOTE
Discussed weight loss would help with hip and knee pain, we will trial Celebrex in place of meloxicam, side effects and admin discussed.

## 2023-02-02 NOTE — ASSESSMENT & PLAN NOTE
Well-controlled with Topamax, however patient is struggling with weight loss and having worsening arthritic pain related to her weight, we are going to go ahead and increase her dose of Topamax to try to help with appetite suppression.  Patient states was previously on 50 mg for migraines and tolerated well and was able to lose some weight.

## 2023-02-02 NOTE — ASSESSMENT & PLAN NOTE
We are increasing patient's dose of Topamax to help with appetite suppression and patient is going to work on a diet and exercise to help with weight loss.  We will follow-up in 6 months

## 2023-02-08 ENCOUNTER — HOSPITAL ENCOUNTER (OUTPATIENT)
Dept: SLEEP MEDICINE | Facility: HOSPITAL | Age: 43
Discharge: HOME | End: 2023-02-08
Admitting: FAMILY MEDICINE
Payer: COMMERCIAL

## 2023-02-08 DIAGNOSIS — G47.10 HYPERSOMNIA: ICD-10-CM

## 2023-02-08 DIAGNOSIS — G47.8 NON-RESTORATIVE SLEEP: ICD-10-CM

## 2023-02-08 DIAGNOSIS — G47.26 CIRCADIAN RHYTHM SLEEP DISORDER, SHIFT WORK TYPE: ICD-10-CM

## 2023-02-08 DIAGNOSIS — E66.9 OBESITY (BMI 30-39.9): ICD-10-CM

## 2023-02-08 DIAGNOSIS — R06.83 SNORING: ICD-10-CM

## 2023-02-08 PROCEDURE — 95810 POLYSOM 6/> YRS 4/> PARAM: CPT

## 2023-02-08 PROCEDURE — 95810 POLYSOM 6/> YRS 4/> PARAM: CPT | Performed by: INTERNAL MEDICINE

## 2023-02-09 ENCOUNTER — HOSPITAL ENCOUNTER (OUTPATIENT)
Dept: SLEEP MEDICINE | Facility: HOSPITAL | Age: 43
Discharge: HOME | End: 2023-02-09
Admitting: FAMILY MEDICINE
Payer: COMMERCIAL

## 2023-02-09 DIAGNOSIS — G47.10 HYPERSOMNIA: ICD-10-CM

## 2023-02-09 DIAGNOSIS — E66.9 OBESITY (BMI 30-39.9): ICD-10-CM

## 2023-02-09 DIAGNOSIS — G47.26 CIRCADIAN RHYTHM SLEEP DISORDER, SHIFT WORK TYPE: ICD-10-CM

## 2023-02-09 DIAGNOSIS — G47.8 NON-RESTORATIVE SLEEP: ICD-10-CM

## 2023-02-09 DIAGNOSIS — R06.83 SNORING: ICD-10-CM

## 2023-02-09 PROCEDURE — 80307 DRUG TEST PRSMV CHEM ANLYZR: CPT | Performed by: FAMILY MEDICINE

## 2023-02-09 PROCEDURE — 95805 MULTIPLE SLEEP LATENCY TEST: CPT | Performed by: INTERNAL MEDICINE

## 2023-02-09 PROCEDURE — 95805 MULTIPLE SLEEP LATENCY TEST: CPT

## 2023-02-10 LAB
AMPHET+METHAMPHET UR QL: NEGATIVE
BARBITURATES UR QL SCN: NEGATIVE
BENZODIAZ UR QL SCN: NEGATIVE
CANNABINOIDS SERPL QL: POSITIVE
COCAINE UR QL: NEGATIVE
METHADONE UR QL SCN: NEGATIVE
OPIATES UR QL: NEGATIVE
OXYCODONE UR QL SCN: NEGATIVE

## 2023-02-13 DIAGNOSIS — F41.9 ANXIETY: ICD-10-CM

## 2023-02-13 DIAGNOSIS — F32.A DEPRESSION, UNSPECIFIED DEPRESSION TYPE: ICD-10-CM

## 2023-02-13 RX ORDER — ESCITALOPRAM OXALATE 10 MG/1
10 TABLET ORAL DAILY
Qty: 30 TABLET | OUTPATIENT
Start: 2023-02-13

## 2023-02-22 ENCOUNTER — OFFICE VISIT (OUTPATIENT)
Dept: SLEEP MEDICINE | Facility: HOSPITAL | Age: 43
End: 2023-02-22
Payer: COMMERCIAL

## 2023-02-22 VITALS
OXYGEN SATURATION: 99 % | HEART RATE: 65 BPM | SYSTOLIC BLOOD PRESSURE: 117 MMHG | DIASTOLIC BLOOD PRESSURE: 77 MMHG | BODY MASS INDEX: 33.17 KG/M2 | WEIGHT: 194.3 LBS | HEIGHT: 64 IN

## 2023-02-22 DIAGNOSIS — G47.419 NARCOLEPSY WITHOUT CATAPLEXY: Primary | ICD-10-CM

## 2023-02-22 PROCEDURE — G0463 HOSPITAL OUTPT CLINIC VISIT: HCPCS

## 2023-02-22 PROCEDURE — 99214 OFFICE O/P EST MOD 30 MIN: CPT | Performed by: INTERNAL MEDICINE

## 2023-02-22 RX ORDER — MODAFINIL 200 MG/1
200 TABLET ORAL DAILY
Qty: 30 TABLET | Refills: 2 | Status: SHIPPED | OUTPATIENT
Start: 2023-02-22

## 2023-02-22 NOTE — PROGRESS NOTES
"  [address] [address]  Phone: [phone]  Fax: [phone]      SLEEP CLINIC FOLLOW UP PROGRESS NOTE.    Sandra COYNE Jacquie  1980  42 y.o.  female      PCP: Veronica Liang APRN      Date of visit: 2/22/2023    Chief Complaint   Patient presents with   • Daytime Sleepiness   Narcolepsy without cataplexy    HPI:  This is a 42 y.o. years old patient is here for the management of narcolepsy without cataplexy..  She had a polysomnography on February 8, 2023 which showed no evidence of sleep apnea.  Following this test she had a multiple sleep latency test on February 9, 2023 which showed shortened sleep latency of 4 minutes and 12 seconds with 3 onset of REM sleep out of 5 naps.    Patient is here to discuss the treatment options.  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  Normally wakes up around 6 AM to get her child to school and then goes to work around 2 PM and comes off work at 10 PM.    Bedtime midnight  Wake time 6 AM  Number of times awakenings at night 2      Mediactions and allergies are reviewed by me and documented in the encounter.  She is not planning to have any more children        SOCIAL ( habits pertaining to sleep medicine)  History of smoking:Yes   History of alcohol use: 4 per week  Caffeine use: 2     REVIEW OF SYSTEMS:   [place] Sleepiness Scale :Total score: 22   Morining headache:No   Anxiety:Yes   Depression:Yes    PHYSICAL EXAMINATION:  CONSTITUTIONAL:  Vitals:    02/22/23 0900   BP: 117/77   Pulse: 65   SpO2: 99%   Weight: 88.1 kg (194 lb 4.8 oz)   Height: 161.3 cm (63.5\")    Body mass index is 33.87 kg/m².   NOSE: nasal passages are clear, no nasal polyps, septum in the midline.  THROAT: throat is clear,   RESP SYSTEM: Breath sounds are normal, no wheezes or crackles  CARDIOVASULAR: Heart rate is regular without murmur. No edema        ASSESSMENT AND " PLAN:  · Narcolepsy without cataplexy G 47.419.  I have discussed the test results with the patient and also discussed treatment options which are mainly stimulants.  I am going to start her on modafinil 200 mg to be taken in the morning between 10 AM and midday.  I also encouraged her to take a nap less than an hour before going to work.  A drug agreement for modafinil is made made and I will see her in about 3 months for follow-up.  Patient is encouraged to call the office if she develops any complications of symptoms does not get better.  She is a patient of Dr. Restrepo and I will see her in 3 months and then transfer the care to Dr. Restrepo  · Obesity, 1 with BMI is Body mass index is 33.87 kg/m².. I have discuss the relationship between the weight and sleep apnea. The benefit of weight loss in reducing severity of sleep apnea was discussed. Discussed diet and exercise with the patient to archive ideal BMI.  · Return in about 3 months (around 5/22/2023) for Next scheduled follow-up. . Patient's questions were answered.        Akosua Martínez MD  Sleep Medicine.  Medical Director, [facility] sleep centers  2/22/2023 ,

## 2023-03-17 DIAGNOSIS — D50.9 IRON DEFICIENCY ANEMIA, UNSPECIFIED IRON DEFICIENCY ANEMIA TYPE: ICD-10-CM

## 2023-03-17 RX ORDER — PNV,CALCIUM 72/IRON/FOLIC ACID 27 MG-1 MG
TABLET ORAL
Qty: 30 TABLET | Refills: 4 | Status: SHIPPED | OUTPATIENT
Start: 2023-03-17

## 2023-05-16 DIAGNOSIS — F32.A DEPRESSION, UNSPECIFIED DEPRESSION TYPE: ICD-10-CM

## 2023-05-16 DIAGNOSIS — F41.9 ANXIETY: ICD-10-CM

## 2023-05-16 RX ORDER — ESCITALOPRAM OXALATE 10 MG/1
10 TABLET ORAL DAILY
Qty: 90 TABLET | Refills: 0 | Status: SHIPPED | OUTPATIENT
Start: 2023-05-16

## 2023-05-22 ENCOUNTER — OFFICE VISIT (OUTPATIENT)
Dept: SLEEP MEDICINE | Facility: HOSPITAL | Age: 43
End: 2023-05-22
Payer: COMMERCIAL

## 2023-05-22 VITALS
HEART RATE: 63 BPM | DIASTOLIC BLOOD PRESSURE: 77 MMHG | OXYGEN SATURATION: 100 % | HEIGHT: 64 IN | SYSTOLIC BLOOD PRESSURE: 118 MMHG | WEIGHT: 188.1 LBS | BODY MASS INDEX: 32.11 KG/M2

## 2023-05-22 DIAGNOSIS — G47.419 NARCOLEPSY WITHOUT CATAPLEXY: Primary | ICD-10-CM

## 2023-05-22 PROCEDURE — 1160F RVW MEDS BY RX/DR IN RCRD: CPT | Performed by: INTERNAL MEDICINE

## 2023-05-22 PROCEDURE — 1159F MED LIST DOCD IN RCRD: CPT | Performed by: INTERNAL MEDICINE

## 2023-05-22 PROCEDURE — G0463 HOSPITAL OUTPT CLINIC VISIT: HCPCS

## 2023-05-22 PROCEDURE — 99214 OFFICE O/P EST MOD 30 MIN: CPT | Performed by: INTERNAL MEDICINE

## 2023-05-22 RX ORDER — TOPIRAMATE 25 MG/1
1 TABLET ORAL EVERY 12 HOURS SCHEDULED
COMMUNITY
Start: 2023-04-16 | End: 2023-05-22 | Stop reason: DRUGHIGH

## 2023-05-22 RX ORDER — DEXTROAMPHETAMINE SACCHARATE, AMPHETAMINE ASPARTATE, DEXTROAMPHETAMINE SULFATE AND AMPHETAMINE SULFATE 5; 5; 5; 5 MG/1; MG/1; MG/1; MG/1
20 TABLET ORAL 2 TIMES DAILY
Qty: 60 TABLET | Refills: 0 | Status: SHIPPED | OUTPATIENT
Start: 2023-05-22

## 2023-05-22 NOTE — PROGRESS NOTES
"  [address] [address]  Phone: [phone]  Fax: [phone]      SLEEP CLINIC FOLLOW UP PROGRESS NOTE.    Sandra Dhillon  1980  42 y.o.  female      PCP: Veronica Liang APRN      Date of visit: 5/22/2023    Chief Complaint   Patient presents with   • Daytime Sleepiness   Narcolepsy without cataplexy    HPI:  This is a 42 y.o. years old patient is here for the management of narcolepsy without cataplexy.  She underwent a polysomnography on February 9, 2023 followed by [facility] which showed shortened sleep latency of 4 minutes and 12 seconds with 3 REM sleep onset out of 5 naps.  She was put on modafinil.  Today patient is here for follow-up reports that it has not helped her much.  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  She feels tired.     Bedtime midnight  Wake time 6 AM, she has to take her son to school  Number of times awakenings at night 1-2        Mediactions and allergies are reviewed by me and documented in the encounter.       SOCIAL ( habits pertaining to sleep medicine)  History of smoking:Yes   History of alcohol use: 2 per week  Caffeine use: 2     REVIEW OF SYSTEMS:   [place] Sleepiness Scale :Total score: 22   Morining headache:No   Anxiety:No   Depression:Yes    PHYSICAL EXAMINATION:  CONSTITUTIONAL:  Vitals:    05/22/23 0900   BP: 118/77   Pulse: 63   SpO2: 100%   Weight: 85.3 kg (188 lb 1.6 oz)   Height: 161.3 cm (63.5\")    Body mass index is 32.79 kg/m².   NOSE: nasal passages are clear, no nasal polyps, septum in the midline.  THROAT: throat is clear,   RESP SYSTEM: Breath sounds are normal, no wheezes or crackles  CARDIOVASULAR: Heart rate is regular without murmur. No edema    Drug agreement for Adderall made    ASSESSMENT AND PLAN:  · Narcolepsy without cataplexy G 47.419.  The modafinil is not efficacious.  It has been discontinued and we will need to put her on Adderall 20 mg twice a day.  " A drug agreement is made today and a prescription is sent to floor pharmacy.  I have instructed her that she has to call the sleep center for refills.  I have also encouraged her to take a nap when possible.  · Obesity, 1 with BMI is Body mass index is 32.79 kg/m².. I have discuss the relationship between the weight and sleep apnea. The benefit of weight loss in reducing severity of sleep apnea was discussed. Discussed diet and exercise with the patient to archive ideal BMI.  · Return in about 6 months (around 11/22/2023) for Next scheduled follow-up. . Patient's questions were answered.        Akosua Martínez MD  Sleep Medicine.  Medical Director, [facility], [facility]  5/22/2023 ,

## 2023-07-24 ENCOUNTER — OFFICE VISIT (OUTPATIENT)
Dept: FAMILY MEDICINE CLINIC | Facility: CLINIC | Age: 43
End: 2023-07-24
Payer: COMMERCIAL

## 2023-07-24 VITALS
SYSTOLIC BLOOD PRESSURE: 100 MMHG | WEIGHT: 181 LBS | BODY MASS INDEX: 30.9 KG/M2 | DIASTOLIC BLOOD PRESSURE: 63 MMHG | HEART RATE: 73 BPM | OXYGEN SATURATION: 97 % | HEIGHT: 64 IN

## 2023-07-24 DIAGNOSIS — M19.90 OSTEOARTHRITIS, UNSPECIFIED OSTEOARTHRITIS TYPE, UNSPECIFIED SITE: ICD-10-CM

## 2023-07-24 DIAGNOSIS — D50.9 IRON DEFICIENCY ANEMIA, UNSPECIFIED IRON DEFICIENCY ANEMIA TYPE: ICD-10-CM

## 2023-07-24 DIAGNOSIS — Z13.29 THYROID DISORDER SCREEN: ICD-10-CM

## 2023-07-24 DIAGNOSIS — F41.9 ANXIETY: ICD-10-CM

## 2023-07-24 DIAGNOSIS — L73.2 HIDRADENITIS SUPPURATIVA: ICD-10-CM

## 2023-07-24 DIAGNOSIS — Z00.00 ANNUAL PHYSICAL EXAM: Primary | ICD-10-CM

## 2023-07-24 DIAGNOSIS — G43.809 OTHER MIGRAINE WITHOUT STATUS MIGRAINOSUS, NOT INTRACTABLE: ICD-10-CM

## 2023-07-24 DIAGNOSIS — Z13.220 LIPID SCREENING: ICD-10-CM

## 2023-07-24 DIAGNOSIS — Z12.31 BREAST CANCER SCREENING BY MAMMOGRAM: ICD-10-CM

## 2023-07-24 PROCEDURE — 1160F RVW MEDS BY RX/DR IN RCRD: CPT | Performed by: NURSE PRACTITIONER

## 2023-07-24 PROCEDURE — 1159F MED LIST DOCD IN RCRD: CPT | Performed by: NURSE PRACTITIONER

## 2023-07-24 PROCEDURE — 99396 PREV VISIT EST AGE 40-64: CPT | Performed by: NURSE PRACTITIONER

## 2023-07-24 RX ORDER — ESCITALOPRAM OXALATE 10 MG/1
10 TABLET ORAL DAILY
Qty: 90 TABLET | Refills: 0 | Status: SHIPPED | OUTPATIENT
Start: 2023-07-24

## 2023-07-24 RX ORDER — CELECOXIB 100 MG/1
100 CAPSULE ORAL 2 TIMES DAILY PRN
Qty: 180 CAPSULE | Refills: 1 | Status: SHIPPED | OUTPATIENT
Start: 2023-07-24

## 2023-07-24 RX ORDER — PNV,CALCIUM 72/IRON/FOLIC ACID 27 MG-1 MG
1 TABLET ORAL DAILY
Qty: 90 TABLET | Refills: 1 | Status: SHIPPED | OUTPATIENT
Start: 2023-07-24

## 2023-07-24 RX ORDER — FERROUS SULFATE 325(65) MG
1 TABLET ORAL
Qty: 90 TABLET | Refills: 1 | Status: SHIPPED | OUTPATIENT
Start: 2023-07-24

## 2023-07-24 RX ORDER — TOPIRAMATE 50 MG/1
50 TABLET, FILM COATED ORAL 2 TIMES DAILY
Qty: 180 TABLET | Refills: 1 | Status: SHIPPED | OUTPATIENT
Start: 2023-07-24

## 2023-07-24 RX ORDER — DOXYCYCLINE HYCLATE 100 MG/1
100 CAPSULE ORAL 2 TIMES DAILY
Qty: 20 CAPSULE | Refills: 0 | Status: SHIPPED | OUTPATIENT
Start: 2023-07-24

## 2023-07-24 NOTE — ASSESSMENT & PLAN NOTE
We will treat current outbreak with doxycycline, refer back to dermatology, follow-up in 2 to 4 weeks for reevaluation

## 2023-07-24 NOTE — PROGRESS NOTES
Chief Complaint  Follow-up anxiety/depression, migraines, annual exam    SUBJECTIVE  Sandra Dhillon presents to [facility]     Pt here today for annual exam, and 6 month follow up on chronic conditions.    Refills, labs, and mammogram due.     Pt has hx of hydradenitis suppurativa (had surgery on bilateral axilla) but continues to have outbreaks in other part of the body (breast, groin ) from time to time, now having a break out under breasts, would like referral back to dermatology.      History of Present Illness  Past Medical History:   Diagnosis Date    Anxiety     Arthritis     Depression     Foot pain, right     Forgetfulness     Heel pain     Hemorrhoids     Hidradenitis suppurativa     Ingrown toenail     Migraines     Numbness in feet       Family History   Problem Relation Age of Onset    Cancer Mother         Unspecified    Cancer Maternal Grandfather         Thyroid pancreatic    Diabetes Other       Past Surgical History:   Procedure Laterality Date    D & C CERVICAL BIOPSY      OTHER SURGICAL HISTORY      Arm surgery    OTHER SURGICAL HISTORY      Excision, sweat glands, inguinal region    TUBAL ABDOMINAL LIGATION          Current Outpatient Medications:     amphetamine-dextroamphetamine (ADDERALL) 20 MG tablet, Take 1 tablet by mouth 2 (Two) Times a Day., Disp: 60 tablet, Rfl: 0    celecoxib (CeleBREX) 100 MG capsule, Take 1 capsule by mouth 2 (Two) Times a Day As Needed for Mild Pain., Disp: 180 capsule, Rfl: 1    escitalopram (LEXAPRO) 10 MG tablet, Take 1 tablet by mouth Daily., Disp: 90 tablet, Rfl: 0    ferrous sulfate (FeroSul) 325 (65 FE) MG tablet, Take 1 tablet by mouth Daily With Breakfast., Disp: 90 tablet, Rfl: 1    Folic Acid (FOLATE PO), Take  by mouth., Disp: , Rfl:     Prenatal Vit-Fe Fumarate-FA (WesTab Plus) 27-1 MG tablet, Take 1 tablet by mouth Daily., Disp: 90 tablet, Rfl: 1    topiramate (Topamax) 50 MG tablet, Take 1 tablet by mouth 2 (Two) Times  "a Day., Disp: 180 tablet, Rfl: 1    doxycycline (VIBRAMYCIN) 100 MG capsule, Take 1 capsule by mouth 2 (Two) Times a Day., Disp: 20 capsule, Rfl: 0    silver sulfadiazine (SSD) 1 % cream, Apply  topically to the appropriate area as directed 2 (Two) Times a Day. (Patient not taking: Reported on 7/24/2023), Disp: 50 g, Rfl: 2    OBJECTIVE  Vital Signs:   /63   Pulse 73   Ht 161.3 cm (63.5\")   Wt 82.1 kg (181 lb)   SpO2 97%   BMI 31.56 kg/m²    Estimated body mass index is 31.56 kg/m² as calculated from the following:    Height as of this encounter: 161.3 cm (63.5\").    Weight as of this encounter: 82.1 kg (181 lb).     Wt Readings from Last 3 Encounters:   07/24/23 82.1 kg (181 lb)   05/22/23 85.3 kg (188 lb 1.6 oz)   02/22/23 88.1 kg (194 lb 4.8 oz)     BP Readings from Last 3 Encounters:   07/24/23 100/63   05/22/23 118/77   02/22/23 117/77       Physical Exam  Vitals reviewed.   Constitutional:       General: She is not in acute distress.     Appearance: Normal appearance. She is well-developed. She is not diaphoretic.   HENT:      Head: Normocephalic and atraumatic. Hair is normal.      Right Ear: Hearing, tympanic membrane, ear canal and external ear normal. No decreased hearing noted. No drainage.      Left Ear: Hearing, tympanic membrane, ear canal and external ear normal. No decreased hearing noted.      Nose: Nose normal. No nasal deformity.      Mouth/Throat:      Mouth: Mucous membranes are moist.   Eyes:      General: Lids are normal.         Right eye: No discharge.         Left eye: No discharge.      Extraocular Movements: Extraocular movements intact.      Conjunctiva/sclera: Conjunctivae normal.      Pupils: Pupils are equal, round, and reactive to light.   Neck:      Thyroid: No thyromegaly.      Vascular: No JVD.   Cardiovascular:      Rate and Rhythm: Normal rate and regular rhythm.      Pulses: Normal pulses.      Heart sounds: Normal heart sounds. No murmur heard.    No friction rub. " No gallop.   Pulmonary:      Effort: Pulmonary effort is normal. No respiratory distress.      Breath sounds: Normal breath sounds. No wheezing, rhonchi or rales.   Chest:      Chest wall: No tenderness.   Abdominal:      General: Bowel sounds are normal. There is no distension.      Palpations: Abdomen is soft. There is no mass.      Tenderness: There is no abdominal tenderness. There is no guarding or rebound.      Hernia: No hernia is present.   Musculoskeletal:         General: No tenderness or deformity. Normal range of motion.      Cervical back: Normal range of motion and neck supple.   Lymphadenopathy:      Cervical: No cervical adenopathy.   Skin:     General: Skin is warm and dry.      Findings: No erythema or rash.      Comments: Right breast fold, significant area of erythema and induration with multiple areas of purulent drainage.   Neurological:      Mental Status: She is alert and oriented to person, place, and time.      Cranial Nerves: No cranial nerve deficit.      Motor: No abnormal muscle tone.      Coordination: Coordination normal.      Deep Tendon Reflexes: Reflexes are normal and symmetric. Reflexes normal.   Psychiatric:         Mood and Affect: Mood and affect normal.         Behavior: Behavior normal.         Thought Content: Thought content normal.         Judgment: Judgment normal.        Result Review    CMP          8/2/2022    11:20 2/2/2023    10:51   CMP   Glucose 81  86    BUN 9  4    Creatinine 0.59  0.57    EGFR 114.8  115.1    Sodium 140  139    Potassium 4.2  3.9    Chloride 104  102    Calcium 8.8  9.4    Total Protein 7.2  7.3    Albumin 3.70  4.0    Globulin 3.5  3.3    Total Bilirubin 0.3  0.3    Alkaline Phosphatase 114  129    AST (SGOT) 15  19    ALT (SGPT) 10  16    Albumin/Globulin Ratio 1.1  1.2    BUN/Creatinine Ratio 15.3  7.0    Anion Gap 10.0  8.0      CBC          8/2/2022    11:20 2/2/2023    10:51   CBC   WBC 9.84  10.73    RBC 3.55  4.13    Hemoglobin 10.5   12.1    Hematocrit 32.0  36.9    MCV 90.1  89.3    MCH 29.6  29.3    MCHC 32.8  32.8    RDW 12.5  12.6    Platelets 339  362      Lipid Panel          8/2/2022    11:20   Lipid Panel   Total Cholesterol 147    Triglycerides 50    HDL Cholesterol 40    VLDL Cholesterol 11    LDL Cholesterol  96    LDL/HDL Ratio 2.43      TSH          8/2/2022    11:20   TSH   TSH 2.120          No Images in the past 120 days found..     The above data has been reviewed by MEME Child 07/24/2023 11:51 EDT.          Patient Care Team:  Veronica Liang APRN as PCP - General (Nurse Practitioner)            ASSESSMENT & PLAN    Diagnoses and all orders for this visit:    1. Annual physical exam (Primary)  -     Comprehensive metabolic panel; Future  -     TSH; Future  -     Lipid panel; Future  -     CBC w AUTO Differential; Future    2. Breast cancer screening by mammogram  -     Mammo Screening Digital Tomosynthesis Bilateral With CAD; Future    3. Osteoarthritis, unspecified osteoarthritis type, unspecified site  Overview:  Symptoms controlled with as needed use of Celebrex, continue current dose    Orders:  -     celecoxib (CeleBREX) 100 MG capsule; Take 1 capsule by mouth 2 (Two) Times a Day As Needed for Mild Pain.  Dispense: 180 capsule; Refill: 1    4. Anxiety  Overview:  Stable and well-controlled with Lexapro, continue current dose    Orders:  -     escitalopram (LEXAPRO) 10 MG tablet; Take 1 tablet by mouth Daily.  Dispense: 90 tablet; Refill: 0    5. Iron deficiency anemia, unspecified iron deficiency anemia type  Comments:  she has been taking an otc iron supplemement but admits fatigue-sent in ferrous sulfate 325mg qd.   Overview:  Well-controlled with ferrous sulfate, continue current dose, labs today    Orders:  -     ferrous sulfate (FeroSul) 325 (65 FE) MG tablet; Take 1 tablet by mouth Daily With Breakfast.  Dispense: 90 tablet; Refill: 1  -     Prenatal Vit-Fe Fumarate-FA (WesTab Plus) 27-1 MG tablet;  Take 1 tablet by mouth Daily.  Dispense: 90 tablet; Refill: 1    6. Other migraine without status migrainosus, not intractable  Overview:  Stable and well-controlled Topamax, continue current dose    Orders:  -     topiramate (Topamax) 50 MG tablet; Take 1 tablet by mouth 2 (Two) Times a Day.  Dispense: 180 tablet; Refill: 1    7. Lipid screening  -     Lipid panel; Future    8. Thyroid disorder screen  -     TSH; Future    9. Hidradenitis suppurativa  Assessment & Plan:  We will treat current outbreak with doxycycline, refer back to dermatology, follow-up in 2 to 4 weeks for reevaluation    Orders:  -     Cancel: Ambulatory Referral to Dermatology  -     doxycycline (VIBRAMYCIN) 100 MG capsule; Take 1 capsule by mouth 2 (Two) Times a Day.  Dispense: 20 capsule; Refill: 0  -     Ambulatory Referral to Dermatology         Tobacco Use: Medium Risk    Smoking Tobacco Use: Former    Smokeless Tobacco Use: Never    Passive Exposure: Not on file       The patient is advised to follow healthy diet and exercise, attempt to lose weight, continue current medications, and return for routine annual checkups.      Follow Up     Return in about 6 months (around 1/24/2024), or if symptoms worsen or fail to improve.        Patient was given instructions and counseling regarding her condition or for health maintenance advice. Please see specific information pulled into the AVS if appropriate.   I have reviewed information obtained and documented by others and I have confirmed the accuracy of this documented note.    MEME Child

## 2023-08-30 ENCOUNTER — TELEPHONE (OUTPATIENT)
Dept: SLEEP MEDICINE | Facility: HOSPITAL | Age: 43
End: 2023-08-30
Payer: COMMERCIAL

## 2023-08-30 DIAGNOSIS — G47.419 NARCOLEPSY WITHOUT CATAPLEXY: ICD-10-CM

## 2023-08-30 RX ORDER — DEXTROAMPHETAMINE SACCHARATE, AMPHETAMINE ASPARTATE, DEXTROAMPHETAMINE SULFATE AND AMPHETAMINE SULFATE 5; 5; 5; 5 MG/1; MG/1; MG/1; MG/1
20 TABLET ORAL 2 TIMES DAILY
Qty: 60 TABLET | Refills: 0 | Status: SHIPPED | OUTPATIENT
Start: 2023-08-30

## 2023-09-05 ENCOUNTER — TELEPHONE (OUTPATIENT)
Dept: SLEEP MEDICINE | Facility: HOSPITAL | Age: 43
End: 2023-09-05
Payer: COMMERCIAL

## 2023-09-05 ENCOUNTER — OFFICE VISIT (OUTPATIENT)
Dept: FAMILY MEDICINE CLINIC | Facility: CLINIC | Age: 43
End: 2023-09-05
Payer: COMMERCIAL

## 2023-09-05 NOTE — PROGRESS NOTES
Chief Complaint  No chief complaint on file.    SUBJECTIVE  Sandra Dhillon presents to [facility]   Pt is here today complaining of rash on her breast still    History of Present Illness  Past Medical History:   Diagnosis Date    Anxiety     Arthritis     Depression     Foot pain, right     Forgetfulness     Heel pain     Hemorrhoids     Hidradenitis suppurativa     Ingrown toenail     Migraines     Numbness in feet       Family History   Problem Relation Age of Onset    Cancer Mother         Unspecified    Cancer Maternal Grandfather         Thyroid pancreatic    Diabetes Other       Past Surgical History:   Procedure Laterality Date    D & C CERVICAL BIOPSY      OTHER SURGICAL HISTORY      Arm surgery    OTHER SURGICAL HISTORY      Excision, sweat glands, inguinal region    TUBAL ABDOMINAL LIGATION          Current Outpatient Medications:     amphetamine-dextroamphetamine (ADDERALL) 20 MG tablet, Take 1 tablet by mouth 2 (Two) Times a Day., Disp: 60 tablet, Rfl: 0    celecoxib (CeleBREX) 100 MG capsule, Take 1 capsule by mouth 2 (Two) Times a Day As Needed for Mild Pain., Disp: 180 capsule, Rfl: 1    doxycycline (VIBRAMYCIN) 100 MG capsule, Take 1 capsule by mouth 2 (Two) Times a Day., Disp: 20 capsule, Rfl: 0    escitalopram (LEXAPRO) 10 MG tablet, Take 1 tablet by mouth Daily., Disp: 90 tablet, Rfl: 0    ferrous sulfate (FeroSul) 325 (65 FE) MG tablet, Take 1 tablet by mouth Daily With Breakfast., Disp: 90 tablet, Rfl: 1    Folic Acid (FOLATE PO), Take  by mouth., Disp: , Rfl:     Prenatal Vit-Fe Fumarate-FA (WesTab Plus) 27-1 MG tablet, Take 1 tablet by mouth Daily., Disp: 90 tablet, Rfl: 1    silver sulfadiazine (SSD) 1 % cream, Apply  topically to the appropriate area as directed 2 (Two) Times a Day. (Patient not taking: Reported on 7/24/2023), Disp: 50 g, Rfl: 2    topiramate (Topamax) 50 MG tablet, Take 1 tablet by mouth 2 (Two) Times a Day., Disp: 180 tablet, Rfl:  "1    OBJECTIVE  Vital Signs:   There were no vitals taken for this visit.   Estimated body mass index is 31.56 kg/m² as calculated from the following:    Height as of 7/24/23: 161.3 cm (63.5\").    Weight as of 7/24/23: 82.1 kg (181 lb).     Wt Readings from Last 3 Encounters:   07/24/23 82.1 kg (181 lb)   05/22/23 85.3 kg (188 lb 1.6 oz)   02/22/23 88.1 kg (194 lb 4.8 oz)     BP Readings from Last 3 Encounters:   07/24/23 100/63   05/22/23 118/77   02/22/23 117/77       Physical Exam     Result Review        No Images in the past 120 days found..     The above data has been reviewed by Amanda Finn Rep 09/05/2023 13:28 EDT.          Patient Care Team:  Veronica Liang APRN as PCP - General (Nurse Practitioner)            ASSESSMENT & PLAN    There are no diagnoses linked to this encounter.     Tobacco Use: Medium Risk    Smoking Tobacco Use: Former    Smokeless Tobacco Use: Never    Passive Exposure: Not on file       Follow Up     No follow-ups on file.        Patient was given instructions and counseling regarding her condition or for health maintenance advice. Please see specific information pulled into the AVS if appropriate.   I have reviewed information obtained and documented by others and I have confirmed the accuracy of this documented note.    Amanda Finn Rep        "

## 2023-09-05 NOTE — TELEPHONE ENCOUNTER
Pt called, she is having trouble with her insurance and the pharmacy told her that Dr. Martínez wasn't in network with passport.    Call to pharmacy, they ran again and it seems that Passport isn't showing her as eligible.  She has paperwork and will look over that tonight.  She will let us know if she has any other problems.

## 2023-09-06 ENCOUNTER — OFFICE VISIT (OUTPATIENT)
Dept: FAMILY MEDICINE CLINIC | Facility: CLINIC | Age: 43
End: 2023-09-06
Payer: COMMERCIAL

## 2023-09-06 ENCOUNTER — LAB (OUTPATIENT)
Dept: LAB | Facility: HOSPITAL | Age: 43
End: 2023-09-06
Payer: COMMERCIAL

## 2023-09-06 VITALS
HEIGHT: 64 IN | SYSTOLIC BLOOD PRESSURE: 108 MMHG | HEART RATE: 80 BPM | WEIGHT: 177 LBS | BODY MASS INDEX: 30.22 KG/M2 | DIASTOLIC BLOOD PRESSURE: 80 MMHG | OXYGEN SATURATION: 100 %

## 2023-09-06 DIAGNOSIS — Z00.00 ANNUAL PHYSICAL EXAM: ICD-10-CM

## 2023-09-06 DIAGNOSIS — L73.2 HIDRADENITIS SUPPURATIVA: ICD-10-CM

## 2023-09-06 DIAGNOSIS — Z13.220 LIPID SCREENING: ICD-10-CM

## 2023-09-06 DIAGNOSIS — Z13.29 THYROID DISORDER SCREEN: ICD-10-CM

## 2023-09-06 DIAGNOSIS — N61.0 INFECTION OF BREAST: Primary | ICD-10-CM

## 2023-09-06 LAB
ALBUMIN SERPL-MCNC: 4 G/DL (ref 3.5–5.2)
ALBUMIN/GLOB SERPL: 1.2 G/DL
ALP SERPL-CCNC: 100 U/L (ref 39–117)
ALT SERPL W P-5'-P-CCNC: 9 U/L (ref 1–33)
ANION GAP SERPL CALCULATED.3IONS-SCNC: 8 MMOL/L (ref 5–15)
AST SERPL-CCNC: 12 U/L (ref 1–32)
BASOPHILS # BLD AUTO: 0.1 10*3/MM3 (ref 0–0.2)
BASOPHILS NFR BLD AUTO: 1.1 % (ref 0–1.5)
BILIRUB SERPL-MCNC: 0.2 MG/DL (ref 0–1.2)
BUN SERPL-MCNC: 5 MG/DL (ref 6–20)
BUN/CREAT SERPL: 8.2 (ref 7–25)
CALCIUM SPEC-SCNC: 9.2 MG/DL (ref 8.6–10.5)
CHLORIDE SERPL-SCNC: 105 MMOL/L (ref 98–107)
CHOLEST SERPL-MCNC: 149 MG/DL (ref 0–200)
CO2 SERPL-SCNC: 25 MMOL/L (ref 22–29)
CREAT SERPL-MCNC: 0.61 MG/DL (ref 0.57–1)
DEPRECATED RDW RBC AUTO: 43 FL (ref 37–54)
EGFRCR SERPLBLD CKD-EPI 2021: 113.2 ML/MIN/1.73
EOSINOPHIL # BLD AUTO: 0.38 10*3/MM3 (ref 0–0.4)
EOSINOPHIL NFR BLD AUTO: 4 % (ref 0.3–6.2)
ERYTHROCYTE [DISTWIDTH] IN BLOOD BY AUTOMATED COUNT: 12.6 % (ref 12.3–15.4)
GLOBULIN UR ELPH-MCNC: 3.4 GM/DL
GLUCOSE SERPL-MCNC: 80 MG/DL (ref 65–99)
HCT VFR BLD AUTO: 35.3 % (ref 34–46.6)
HDLC SERPL-MCNC: 38 MG/DL (ref 40–60)
HGB BLD-MCNC: 11.4 G/DL (ref 12–15.9)
IMM GRANULOCYTES # BLD AUTO: 0.03 10*3/MM3 (ref 0–0.05)
IMM GRANULOCYTES NFR BLD AUTO: 0.3 % (ref 0–0.5)
LDLC SERPL CALC-MCNC: 95 MG/DL (ref 0–100)
LDLC/HDLC SERPL: 2.49 {RATIO}
LYMPHOCYTES # BLD AUTO: 2.09 10*3/MM3 (ref 0.7–3.1)
LYMPHOCYTES NFR BLD AUTO: 22.2 % (ref 19.6–45.3)
MCH RBC QN AUTO: 29.8 PG (ref 26.6–33)
MCHC RBC AUTO-ENTMCNC: 32.3 G/DL (ref 31.5–35.7)
MCV RBC AUTO: 92.4 FL (ref 79–97)
MONOCYTES # BLD AUTO: 0.65 10*3/MM3 (ref 0.1–0.9)
MONOCYTES NFR BLD AUTO: 6.9 % (ref 5–12)
NEUTROPHILS NFR BLD AUTO: 6.18 10*3/MM3 (ref 1.7–7)
NEUTROPHILS NFR BLD AUTO: 65.5 % (ref 42.7–76)
NRBC BLD AUTO-RTO: 0.1 /100 WBC (ref 0–0.2)
PLATELET # BLD AUTO: 351 10*3/MM3 (ref 140–450)
PMV BLD AUTO: 11.3 FL (ref 6–12)
POTASSIUM SERPL-SCNC: 4.3 MMOL/L (ref 3.5–5.2)
PROT SERPL-MCNC: 7.4 G/DL (ref 6–8.5)
RBC # BLD AUTO: 3.82 10*6/MM3 (ref 3.77–5.28)
SODIUM SERPL-SCNC: 138 MMOL/L (ref 136–145)
TRIGL SERPL-MCNC: 82 MG/DL (ref 0–150)
TSH SERPL DL<=0.05 MIU/L-ACNC: 1.61 UIU/ML (ref 0.27–4.2)
VLDLC SERPL-MCNC: 16 MG/DL (ref 5–40)
WBC NRBC COR # BLD: 9.43 10*3/MM3 (ref 3.4–10.8)

## 2023-09-06 PROCEDURE — 84443 ASSAY THYROID STIM HORMONE: CPT

## 2023-09-06 PROCEDURE — 87186 SC STD MICRODIL/AGAR DIL: CPT | Performed by: NURSE PRACTITIONER

## 2023-09-06 PROCEDURE — 87070 CULTURE OTHR SPECIMN AEROBIC: CPT | Performed by: NURSE PRACTITIONER

## 2023-09-06 PROCEDURE — 80053 COMPREHEN METABOLIC PANEL: CPT

## 2023-09-06 PROCEDURE — 36415 COLL VENOUS BLD VENIPUNCTURE: CPT

## 2023-09-06 PROCEDURE — 99214 OFFICE O/P EST MOD 30 MIN: CPT | Performed by: NURSE PRACTITIONER

## 2023-09-06 PROCEDURE — 85025 COMPLETE CBC W/AUTO DIFF WBC: CPT

## 2023-09-06 PROCEDURE — 80061 LIPID PANEL: CPT

## 2023-09-06 PROCEDURE — 1159F MED LIST DOCD IN RCRD: CPT | Performed by: NURSE PRACTITIONER

## 2023-09-06 PROCEDURE — 87077 CULTURE AEROBIC IDENTIFY: CPT | Performed by: NURSE PRACTITIONER

## 2023-09-06 PROCEDURE — 87205 SMEAR GRAM STAIN: CPT | Performed by: NURSE PRACTITIONER

## 2023-09-06 PROCEDURE — 1160F RVW MEDS BY RX/DR IN RCRD: CPT | Performed by: NURSE PRACTITIONER

## 2023-09-06 RX ORDER — SULFAMETHOXAZOLE AND TRIMETHOPRIM 800; 160 MG/1; MG/1
1 TABLET ORAL 2 TIMES DAILY
Qty: 20 TABLET | Refills: 0 | Status: SHIPPED | OUTPATIENT
Start: 2023-09-06

## 2023-09-06 NOTE — ASSESSMENT & PLAN NOTE
Urgent referral to general surgery, breast surgeon, for evaluation, do not think at this point patient could tolerate mammogram or even ultrasound due to significant tenderness upon palpation, if unable to see general surgery in timely manner will likely order MRI breast, culture obtained and patient placed on Bactrim, awaiting callback with appointment for GS

## 2023-09-06 NOTE — PROGRESS NOTES
Attaching picture of breast infection below. MEME Child obtained Verbal Consent during office visit.

## 2023-09-06 NOTE — PROGRESS NOTES
Chief Complaint  Breast infection     SUBJECTIVE  Sandra Dhillon presents to [facility]   Pt is here today complaining her HS is flared up but even worse this time.Pt states she finished her antibiotics that was rx'd in July, but it never resolved, pt did not come back in for for her f/u appt because [facility], was placed on clindamycin and prednisone at that time, took 10 days of clindamycin, patient states the infection keeps getting worse.  Pt states it is spreading from under her RT breast up the side of it.Pt has an upcoming mammogram in October would like to get it treated before she has that done.      History of Present Illness  Past Medical History:   Diagnosis Date    Anxiety     Arthritis     Depression     Foot pain, right     Forgetfulness     Heel pain     Hemorrhoids     Hidradenitis suppurativa     Ingrown toenail     Migraines     Numbness in feet       Family History   Problem Relation Age of Onset    Cancer Mother         Unspecified    Cancer Maternal Grandfather         Thyroid pancreatic    Diabetes Other       Past Surgical History:   Procedure Laterality Date    D & C CERVICAL BIOPSY      OTHER SURGICAL HISTORY      Arm surgery    OTHER SURGICAL HISTORY      Excision, sweat glands, inguinal region    TUBAL ABDOMINAL LIGATION          Current Outpatient Medications:     amphetamine-dextroamphetamine (ADDERALL) 20 MG tablet, Take 1 tablet by mouth 2 (Two) Times a Day., Disp: 60 tablet, Rfl: 0    celecoxib (CeleBREX) 100 MG capsule, Take 1 capsule by mouth 2 (Two) Times a Day As Needed for Mild Pain., Disp: 180 capsule, Rfl: 1    escitalopram (LEXAPRO) 10 MG tablet, Take 1 tablet by mouth Daily., Disp: 90 tablet, Rfl: 0    ferrous sulfate (FeroSul) 325 (65 FE) MG tablet, Take 1 tablet by mouth Daily With Breakfast., Disp: 90 tablet, Rfl: 1    Folic Acid (FOLATE PO), Take  by mouth., Disp: , Rfl:     Prenatal Vit-Fe Fumarate-FA (WesTab Plus) 27-1 MG tablet,  "Take 1 tablet by mouth Daily., Disp: 90 tablet, Rfl: 1    topiramate (Topamax) 50 MG tablet, Take 1 tablet by mouth 2 (Two) Times a Day., Disp: 180 tablet, Rfl: 1    silver sulfadiazine (SSD) 1 % cream, Apply  topically to the appropriate area as directed 2 (Two) Times a Day. (Patient not taking: Reported on 7/24/2023), Disp: 50 g, Rfl: 2    sulfamethoxazole-trimethoprim (BACTRIM DS,SEPTRA DS) 800-160 MG per tablet, Take 1 tablet by mouth 2 (Two) Times a Day., Disp: 20 tablet, Rfl: 0    OBJECTIVE  Vital Signs:   /80   Pulse 80   Ht 161.3 cm (63.5\")   Wt 80.3 kg (177 lb)   SpO2 100%   BMI 30.86 kg/m²    Estimated body mass index is 30.86 kg/m² as calculated from the following:    Height as of this encounter: 161.3 cm (63.5\").    Weight as of this encounter: 80.3 kg (177 lb).     Wt Readings from Last 3 Encounters:   09/06/23 80.3 kg (177 lb)   07/24/23 82.1 kg (181 lb)   05/22/23 85.3 kg (188 lb 1.6 oz)     BP Readings from Last 3 Encounters:   09/06/23 108/80   07/24/23 100/63   05/22/23 118/77       Physical Exam  Vitals reviewed.   Constitutional:       Appearance: Normal appearance. She is well-developed.   HENT:      Head: Normocephalic and atraumatic.      Right Ear: External ear normal.      Left Ear: External ear normal.   Eyes:      Conjunctiva/sclera: Conjunctivae normal.      Pupils: Pupils are equal, round, and reactive to light.   Cardiovascular:      Rate and Rhythm: Normal rate and regular rhythm.      Heart sounds: No murmur heard.    No friction rub. No gallop.   Pulmonary:      Effort: Pulmonary effort is normal.      Breath sounds: Normal breath sounds. No wheezing or rhonchi.   Chest:          Comments: Significant worsening of area of erythema and induration on right breast, now extends up into the breast, multiple areas of drainage noted, patient is extremely tender to touch  Skin:     General: Skin is warm and dry.   Neurological:      Mental Status: She is alert and oriented to " person, place, and time.      Cranial Nerves: No cranial nerve deficit.   Psychiatric:         Mood and Affect: Mood and affect normal.         Behavior: Behavior normal.         Thought Content: Thought content normal.         Judgment: Judgment normal.        Result Review    CMP          2/2/2023    10:51   CMP   Glucose 86    BUN 4    Creatinine 0.57    EGFR 115.1    Sodium 139    Potassium 3.9    Chloride 102    Calcium 9.4    Total Protein 7.3    Albumin 4.0    Globulin 3.3    Total Bilirubin 0.3    Alkaline Phosphatase 129    AST (SGOT) 19    ALT (SGPT) 16    Albumin/Globulin Ratio 1.2    BUN/Creatinine Ratio 7.0    Anion Gap 8.0      CBC          2/2/2023    10:51   CBC   WBC 10.73    RBC 4.13    Hemoglobin 12.1    Hematocrit 36.9    MCV 89.3    MCH 29.3    MCHC 32.8    RDW 12.6    Platelets 362                  Lab Results   Component Value Date    FREET4 1.01 09/09/2021     Lab Results   Component Value Date    RIJUIGOR54 417 09/09/2021       No Images in the past 120 days found..     The above data has been reviewed by MEME Child 09/06/2023 09:55 EDT.          Patient Care Team:  Veronica Liang APRN as PCP - General (Nurse Practitioner)            ASSESSMENT & PLAN    Diagnoses and all orders for this visit:    1. Infection of breast (Primary)  Assessment & Plan:  Urgent referral to general surgery, breast surgeon, for evaluation, do not think at this point patient could tolerate mammogram or even ultrasound due to significant tenderness upon palpation, if unable to see general surgery in timely manner will likely order MRI breast, culture obtained and patient placed on Bactrim, awaiting callback with appointment for GS     Orders:  -     Ambulatory Referral to General Surgery  -     sulfamethoxazole-trimethoprim (BACTRIM DS,SEPTRA DS) 800-160 MG per tablet; Take 1 tablet by mouth 2 (Two) Times a Day.  Dispense: 20 tablet; Refill: 0  -     Wound Culture - Wound, Breast, Right;  Future    2. Hidradenitis suppurativa  Assessment & Plan:  They were unable to reach patient to set up referral to dermatology, we will reopen referral today    Orders:  -     sulfamethoxazole-trimethoprim (BACTRIM DS,SEPTRA DS) 800-160 MG per tablet; Take 1 tablet by mouth 2 (Two) Times a Day.  Dispense: 20 tablet; Refill: 0  -     Wound Culture - Wound, Breast, Right; Future  -     Ambulatory Referral to Dermatology    Patient's breast photographed with her permission.    Tobacco Use: High Risk    Smoking Tobacco Use: Every Day    Smokeless Tobacco Use: Never    Passive Exposure: Not on file       Follow Up     Return if symptoms worsen or fail to improve.        Patient was given instructions and counseling regarding her condition or for health maintenance advice. Please see specific information pulled into the AVS if appropriate.   I have reviewed information obtained and documented by others and I have confirmed the accuracy of this documented note.    MEME Child

## 2023-09-06 NOTE — ASSESSMENT & PLAN NOTE
They were unable to reach patient to set up referral to dermatology, we will reopen referral today

## 2023-09-07 DIAGNOSIS — D64.9 ANEMIA, UNSPECIFIED TYPE: Primary | ICD-10-CM

## 2023-09-10 LAB
BACTERIA SPEC AEROBE CULT: ABNORMAL
BACTERIA SPEC AEROBE CULT: ABNORMAL
GRAM STN SPEC: ABNORMAL

## 2023-09-11 ENCOUNTER — TELEPHONE (OUTPATIENT)
Dept: FAMILY MEDICINE CLINIC | Facility: CLINIC | Age: 43
End: 2023-09-11
Payer: COMMERCIAL

## 2023-09-11 RX ORDER — CEFDINIR 300 MG/1
300 CAPSULE ORAL 2 TIMES DAILY
Qty: 20 CAPSULE | Refills: 0 | Status: SHIPPED | OUTPATIENT
Start: 2023-09-11

## 2023-09-11 RX ORDER — FLUCONAZOLE 150 MG/1
TABLET ORAL
Qty: 2 TABLET | Refills: 0 | Status: SHIPPED | OUTPATIENT
Start: 2023-09-11

## 2023-09-11 NOTE — TELEPHONE ENCOUNTER
Caller: Sandra Dhillon    Relationship: Self    Best call back number: [phone]     What medication are you requesting: SOMETHING TO HELP WITH SYMPTOMS     What are your current symptoms: ITCHING, BURNING AND TROUBLE USING RESTROOM     How long have you been experiencing symptoms: 12 HOURS     Have you had these symptoms before:    [x] Yes  [] No    Have you been treated for these symptoms before:   [x] Yes  [] No    If a prescription is needed, what is your preferred pharmacy and phone number:      [id number] - [place], [place] - [address] AT [address] - [phone]  - [phone]  [phone]     Additional notes:    PATIENT STATED SHE HAS BEEN ON ANTIBIOTICS FOR OVER A MONTH AND NOW EXPERNCING SYMPTOMS SUCH AS ITCHING, BURNING AND TROUBLE USING THE RESTROOM     PLEASE ADVISE           Caller: Sandra Dhillon    Relationship: Self    Best call back number:    What test was performed: BLOOD WORK AND CULTURE ON RIGHT BREAST      When was the test performed: 8-6-23    Additional notes: PATIENT IS REQUESTING A CALL BACK REGARDING RESULTS AND ALSO WANTED TO SPEAK SOMEONE TO SPEAK TO ABOUT MEDICATION TO HELP WITH HS.    PLEASE ADVISE

## 2023-09-11 NOTE — TELEPHONE ENCOUNTER
Pt states its a yeast infection due to the antibiotics she's been taking. Aware of diflucan and Cefdinir

## 2023-09-11 NOTE — TELEPHONE ENCOUNTER
Please clarify what patient means by difficulty using the bathroom, if she is having symptoms of potential UTI, she needs to be seen for evaluation, if she is having itching burning vaginal discharge like a yeast infection, which would be common after antibiotics, she needs Diflucan, I have sent in a prescription, she will take the first tablet now, and take it once more in 72 hours.  Follow-up if no improvement, also please ensure that she knows that I signed off on her culture earlier and changed her antibiotic.

## 2023-09-13 ENCOUNTER — OFFICE VISIT (OUTPATIENT)
Dept: SURGERY | Facility: CLINIC | Age: 43
End: 2023-09-13
Payer: COMMERCIAL

## 2023-09-13 DIAGNOSIS — N61.0 INFECTION OF BREAST: Primary | ICD-10-CM

## 2023-09-13 DIAGNOSIS — L73.2 HIDRADENITIS SUPPURATIVA: ICD-10-CM

## 2023-09-13 PROCEDURE — 99203 OFFICE O/P NEW LOW 30 MIN: CPT | Performed by: SURGERY

## 2023-09-13 PROCEDURE — 1159F MED LIST DOCD IN RCRD: CPT | Performed by: SURGERY

## 2023-09-13 PROCEDURE — 1160F RVW MEDS BY RX/DR IN RCRD: CPT | Performed by: SURGERY

## 2023-09-13 NOTE — PROGRESS NOTES
Chief Complaint: Breast Pain    Subjective         History of Present Illness  Sandra Dhillon is a 42 y.o. female presents to [facility] to be seen for hidradenitis supprativa of the right breast.  This encompasses the entire lateral and inferior aspects of her breast.  It is continuous in nature and has multiple spots of drainage.  She has not had an ultrasound to assess for underlying fluid collections.     Objective     Past Medical History:   Diagnosis Date    Anxiety     Arthritis     Depression     Foot pain, right     Forgetfulness     Heel pain     Hemorrhoids     Hidradenitis suppurativa     Ingrown toenail     Migraines     Numbness in feet        Past Surgical History:   Procedure Laterality Date    D & C CERVICAL BIOPSY      OTHER SURGICAL HISTORY      Arm surgery    OTHER SURGICAL HISTORY      Excision, sweat glands, inguinal region    TUBAL ABDOMINAL LIGATION           Current Outpatient Medications:     amphetamine-dextroamphetamine (ADDERALL) 20 MG tablet, Take 1 tablet by mouth 2 (Two) Times a Day., Disp: 60 tablet, Rfl: 0    cefdinir (OMNICEF) 300 MG capsule, Take 1 capsule by mouth 2 (Two) Times a Day., Disp: 20 capsule, Rfl: 0    celecoxib (CeleBREX) 100 MG capsule, Take 1 capsule by mouth 2 (Two) Times a Day As Needed for Mild Pain., Disp: 180 capsule, Rfl: 1    escitalopram (LEXAPRO) 10 MG tablet, Take 1 tablet by mouth Daily., Disp: 90 tablet, Rfl: 0    ferrous sulfate (FeroSul) 325 (65 FE) MG tablet, Take 1 tablet by mouth Daily With Breakfast., Disp: 90 tablet, Rfl: 1    fluconazole (Diflucan) 150 MG tablet, One tablet PO now, repeat one tab PO in 72 hours, Disp: 2 tablet, Rfl: 0    Folic Acid (FOLATE PO), Take  by mouth., Disp: , Rfl:     Prenatal Vit-Fe Fumarate-FA (WesTab Plus) 27-1 MG tablet, Take 1 tablet by mouth Daily., Disp: 90 tablet, Rfl: 1    topiramate (Topamax) 50 MG tablet, Take 1 tablet by mouth 2 (Two) Times a Day., Disp: 180 tablet, Rfl:  1    No Known Allergies     Family History   Problem Relation Age of Onset    Cancer Mother         Unspecified    Arthritis Mother     Diabetes Mother     Cancer Maternal Grandfather         Thyroid pancreatic    Diabetes Other     Diabetes Paternal Grandmother        Social History     Socioeconomic History    Marital status:    Tobacco Use    Smoking status: Every Day     Packs/day: 0.25     Years: 15.00     Pack years: 3.75     Types: Cigarettes    Smokeless tobacco: Never   Vaping Use    Vaping Use: Never used   Substance and Sexual Activity    Alcohol use: Not Currently     Comment: occasionally    Drug use: Not Currently    Sexual activity: Yes     Partners: Male     Birth control/protection: Vasectomy       Vital Signs:   There were no vitals taken for this visit.   Review of Systems    Physical Exam  Vitals and nursing note reviewed.   Constitutional:       Appearance: Normal appearance.   HENT:      Head: Normocephalic and atraumatic.   Eyes:      Extraocular Movements: Extraocular movements intact.      Pupils: Pupils are equal, round, and reactive to light.   Cardiovascular:      Pulses: Normal pulses.   Pulmonary:      Effort: Pulmonary effort is normal. No accessory muscle usage or respiratory distress.   Chest:   Breasts:     Right: Normal. Skin change and tenderness present. No inverted nipple or nipple discharge.      Left: Normal. No inverted nipple, nipple discharge or skin change.      Comments: + erythema and skin changes on lateral and inferior right breast with multiple sites of drainage  Abdominal:      General: Abdomen is flat.      Palpations: Abdomen is soft.      Tenderness: There is no abdominal tenderness. There is no guarding.   Musculoskeletal:         General: No swelling, tenderness or deformity.      Cervical back: Neck supple.   Lymphadenopathy:      Upper Body:      Right upper body: No supraclavicular or axillary adenopathy.      Left upper body: No supraclavicular or  axillary adenopathy.   Skin:     General: Skin is warm and dry.   Neurological:      General: No focal deficit present.      Mental Status: She is alert and oriented to person, place, and time.   Psychiatric:         Mood and Affect: Mood normal.         Thought Content: Thought content normal.        Result Review :               Assessment and Plan    Diagnoses and all orders for this visit:    1. Infection of breast (Primary)  -     US Breast Right Limited; Future    2. Hidradenitis suppurativa    Will plan for excision of infected lesion on breast    Follow Up   Return for Next scheduled followup after surgery.  Patient was given instructions and counseling regarding her condition or for health maintenance advice. Please see specific information pulled into the AVS if appropriate.         This document has been electronically signed by Kala Jacome MD  September 13, 2023 14:14 EDT

## 2023-09-19 ENCOUNTER — TELEPHONE (OUTPATIENT)
Dept: SURGERY | Facility: CLINIC | Age: 43
End: 2023-09-19
Payer: COMMERCIAL

## 2023-09-19 NOTE — TELEPHONE ENCOUNTER
Pt is scheduled for the breast us that was ordered. She is very concerned that she won't be able to tolerate it because of the flare up she is having. She states that even her bra touching it is excruciating. Wants to know if there is an alternate option or if the area could be numbed in any way.

## 2023-09-19 NOTE — TELEPHONE ENCOUNTER
She is going to see Plastics this Thursday 9/21. Her us is scheduled for 9/26. She will leave it on for now unless we tell her otherwise.

## 2023-09-21 ENCOUNTER — OFFICE VISIT (OUTPATIENT)
Dept: PLASTIC SURGERY | Facility: CLINIC | Age: 43
End: 2023-09-21
Payer: COMMERCIAL

## 2023-09-21 VITALS
TEMPERATURE: 98.3 F | DIASTOLIC BLOOD PRESSURE: 71 MMHG | WEIGHT: 177 LBS | OXYGEN SATURATION: 95 % | BODY MASS INDEX: 30.22 KG/M2 | SYSTOLIC BLOOD PRESSURE: 101 MMHG | HEIGHT: 64 IN | HEART RATE: 102 BPM

## 2023-09-21 DIAGNOSIS — Z01.818 PRE-OPERATIVE EXAMINATION: Primary | ICD-10-CM

## 2023-09-21 DIAGNOSIS — L73.2 HIDRADENITIS SUPPURATIVA: ICD-10-CM

## 2023-09-22 ENCOUNTER — PATIENT MESSAGE (OUTPATIENT)
Dept: PLASTIC SURGERY | Facility: CLINIC | Age: 43
End: 2023-09-22
Payer: COMMERCIAL

## 2023-09-22 ENCOUNTER — PATIENT ROUNDING (BHMG ONLY) (OUTPATIENT)
Dept: PLASTIC SURGERY | Facility: CLINIC | Age: 43
End: 2023-09-22
Payer: COMMERCIAL

## 2023-09-22 NOTE — PROGRESS NOTES
[employer] message has been sent to the patient for PATIENT ROUNDING with [facility].

## 2023-09-26 ENCOUNTER — HOSPITAL ENCOUNTER (OUTPATIENT)
Dept: ULTRASOUND IMAGING | Facility: HOSPITAL | Age: 43
Discharge: HOME | End: 2023-09-26
Admitting: SURGERY
Payer: COMMERCIAL

## 2023-09-26 DIAGNOSIS — N61.0 INFECTION OF BREAST: ICD-10-CM

## 2023-09-26 PROCEDURE — 76642 ULTRASOUND BREAST LIMITED: CPT

## 2023-10-04 ENCOUNTER — TELEPHONE (OUTPATIENT)
Dept: FAMILY MEDICINE CLINIC | Facility: CLINIC | Age: 43
End: 2023-10-04
Payer: COMMERCIAL

## 2023-10-04 NOTE — TELEPHONE ENCOUNTER
Hello, we have a referral for Dermatology that appears has not been scheduled. Could you please call [phone] [facility] in [place] to make that appointment.     If you are not wanting this referral any longer please call our office at [phone] to have to closed.    Thank you.

## 2023-10-09 DIAGNOSIS — G47.419 NARCOLEPSY WITHOUT CATAPLEXY: ICD-10-CM

## 2023-10-09 RX ORDER — DEXTROAMPHETAMINE SACCHARATE, AMPHETAMINE ASPARTATE, DEXTROAMPHETAMINE SULFATE AND AMPHETAMINE SULFATE 5; 5; 5; 5 MG/1; MG/1; MG/1; MG/1
20 TABLET ORAL 2 TIMES DAILY
Qty: 60 TABLET | Refills: 0 | Status: SHIPPED | OUTPATIENT
Start: 2023-10-09

## 2023-10-17 ENCOUNTER — OFFICE VISIT (OUTPATIENT)
Dept: FAMILY MEDICINE CLINIC | Facility: CLINIC | Age: 43
End: 2023-10-17
Payer: COMMERCIAL

## 2023-10-17 DIAGNOSIS — F17.210 CIGARETTE NICOTINE DEPENDENCE WITHOUT COMPLICATION: Primary | ICD-10-CM

## 2023-10-17 PROCEDURE — 99213 OFFICE O/P EST LOW 20 MIN: CPT | Performed by: NURSE PRACTITIONER

## 2023-10-17 PROCEDURE — 1160F RVW MEDS BY RX/DR IN RCRD: CPT | Performed by: NURSE PRACTITIONER

## 2023-10-17 PROCEDURE — 1159F MED LIST DOCD IN RCRD: CPT | Performed by: NURSE PRACTITIONER

## 2023-10-17 RX ORDER — VARENICLINE TARTRATE
KIT
Qty: 1 EACH | Refills: 0 | Status: SHIPPED | OUTPATIENT
Start: 2023-10-17 | End: 2024-01-08

## 2023-10-17 RX ORDER — VARENICLINE TARTRATE 1 MG/1
1 TABLET, FILM COATED ORAL 2 TIMES DAILY
Qty: 180 TABLET | Refills: 0 | Status: SHIPPED | OUTPATIENT
Start: 2023-10-17

## 2023-10-17 NOTE — PROGRESS NOTES
Chief Complaint  Smoking cessation     SUBJECTIVE  Sandra Dihllon presents to [facility]     You have chosen to receive care through a telehealth visit.  Do you consent to use a video/audio connection for your medical care today? Yes      Pt doing Tele Health today, being seen via [employer], patient is at home, provider is in office to discuss options to help stop smoking.  Patient reports that she has to quit smoking in order to have her breast surgery.  States that she received a call about her dermatology appointment, has to call back and schedule.  Patient reports that she is only quit smoking once, while she was pregnant with her son, started smoking again shortly thereafter.    Patient reports currently smoking about three fourths of a pack daily      History of Present Illness  Past Medical History:   Diagnosis Date    Anxiety     Arthritis     Depression     Foot pain, right     Forgetfulness     Heel pain     Hemorrhoids     Hidradenitis suppurativa     Ingrown toenail     Migraines     Numbness in feet       Family History   Problem Relation Age of Onset    Cancer Mother         Unspecified    Arthritis Mother     Diabetes Mother     Cancer Maternal Grandfather         Thyroid pancreatic    Diabetes Other     Diabetes Paternal Grandmother       Past Surgical History:   Procedure Laterality Date    D & C CERVICAL BIOPSY      OTHER SURGICAL HISTORY      Arm surgery    OTHER SURGICAL HISTORY      Excision, sweat glands, inguinal region    TUBAL ABDOMINAL LIGATION          Current Outpatient Medications:     amphetamine-dextroamphetamine (ADDERALL) 20 MG tablet, Take 1 tablet by mouth 2 (Two) Times a Day., Disp: 60 tablet, Rfl: 0    celecoxib (CeleBREX) 100 MG capsule, Take 1 capsule by mouth 2 (Two) Times a Day As Needed for Mild Pain., Disp: 180 capsule, Rfl: 1    escitalopram (LEXAPRO) 10 MG tablet, Take 1 tablet by mouth Daily., Disp: 90 tablet, Rfl: 0    ferrous sulfate  "(FeroSul) 325 (65 FE) MG tablet, Take 1 tablet by mouth Daily With Breakfast., Disp: 90 tablet, Rfl: 1    Folic Acid (FOLATE PO), Take  by mouth., Disp: , Rfl:     Prenatal Vit-Fe Fumarate-FA (WesTab Plus) 27-1 MG tablet, Take 1 tablet by mouth Daily., Disp: 90 tablet, Rfl: 1    topiramate (Topamax) 50 MG tablet, Take 1 tablet by mouth 2 (Two) Times a Day., Disp: 180 tablet, Rfl: 1    cefdinir (OMNICEF) 300 MG capsule, Take 1 capsule by mouth 2 (Two) Times a Day. (Patient not taking: Reported on 10/17/2023), Disp: 20 capsule, Rfl: 0    Chantix Starting Month Daniel 0.5 MG X 11 & 1 MG X 42 tablet therapy pack, Take 0.5 mg by mouth Daily for 3 days, THEN 0.5 mg 2 (Two) Times a Day for 4 days, THEN 1 mg 2 (Two) Times a Day for 77 days. Stop smoking after 1 week.  Take medication with food., Disp: 1 each, Rfl: 0    fluconazole (Diflucan) 150 MG tablet, One tablet PO now, repeat one tab PO in 72 hours (Patient not taking: Reported on 9/21/2023), Disp: 2 tablet, Rfl: 0    varenicline (CHANTIX) 1 MG tablet, Take 1 tablet by mouth 2 (Two) Times a Day., Disp: 180 tablet, Rfl: 0    OBJECTIVE  Vital Signs:   There were no vitals taken for this visit.   Estimated body mass index is 30.86 kg/m² as calculated from the following:    Height as of 9/21/23: 161.3 cm (63.5\").    Weight as of 9/21/23: 80.3 kg (177 lb).     Wt Readings from Last 3 Encounters:   09/21/23 80.3 kg (177 lb)   09/06/23 80.3 kg (177 lb)   07/24/23 82.1 kg (181 lb)     BP Readings from Last 3 Encounters:   09/21/23 101/71   09/06/23 108/80   07/24/23 100/63       Physical Exam  Constitutional:       Appearance: Normal appearance.   Pulmonary:      Effort: Pulmonary effort is normal.   Neurological:      Mental Status: She is alert and oriented to person, place, and time.   Psychiatric:         Mood and Affect: Mood normal.         Behavior: Behavior normal.         Thought Content: Thought content normal.         Judgment: Judgment normal.          Result Review  "   CMP          2/2/2023    10:51 9/6/2023    11:11   CMP   Glucose 86  80    BUN 4  5    Creatinine 0.57  0.61    EGFR 115.1  113.2    Sodium 139  138    Potassium 3.9  4.3    Chloride 102  105    Calcium 9.4  9.2    Total Protein 7.3  7.4    Albumin 4.0  4.0    Globulin 3.3  3.4    Total Bilirubin 0.3  0.2    Alkaline Phosphatase 129  100    AST (SGOT) 19  12    ALT (SGPT) 16  9    Albumin/Globulin Ratio 1.2  1.2    BUN/Creatinine Ratio 7.0  8.2    Anion Gap 8.0  8.0      CBC          2/2/2023    10:51 9/6/2023    11:11   CBC   WBC 10.73  9.43    RBC 4.13  3.82    Hemoglobin 12.1  11.4    Hematocrit 36.9  35.3    MCV 89.3  92.4    MCH 29.3  29.8    MCHC 32.8  32.3    RDW 12.6  12.6    Platelets 362  351      Lipid Panel          9/6/2023    11:11   Lipid Panel   Total Cholesterol 149    Triglycerides 82    HDL Cholesterol 38    VLDL Cholesterol 16    LDL Cholesterol  95    LDL/HDL Ratio 2.49      TSH          9/6/2023    11:11   TSH   TSH 1.610        US Breast Right Limited    Result Date: 9/26/2023  Hypervascular right breast skin thickening at the location of right breast skin changes.  No underlying fluid collection seen.  Recommend clinical follow-up/management/possible surgical management.  The patient is due for annual screening mammography, but has chosen to defer due to pain.  I discussed results with the patient following the exam.   RECOMMENDATION(S):  CLINICAL EVALUATION  AND DUE FOR ROUTINE MAMMOGRAM.   BIRADS:  DIAGNOSTIC CATEGORY 2--BENIGN FINDING   PLEASE NOTE:  THE AMERICAN CANCER SOCIETY NOW RECOMMENDS THAT ALL WOMEN WITH >20% LIFETIME RISK OF BREAST CANCER UNDERGO ANNUAL SCREENING BREAST MRI AND WOMEN WITH 15-20% SPEAK WITH THEIR DOCTORS ABOUT ANNUAL SCREENING BREAST MRI.  A NORMAL ULTRASOUND EXAMINATION DOES NOT EXCLUDE THE POSSIBILITY OF BREAST CANCER.  A CLINICALLY SUSPICIOUS PALPABLE LUMP SHOULD BE BIOPSIED.     GERI EDUARDO MD       Electronically Signed and Approved By: GERI EDUARDO,  MD on 9/26/2023 at 10:47                The above data has been reviewed by MEME Child 10/17/2023 09:36 EDT.          Patient Care Team:  Veronica Liang APRN as PCP - General (Nurse Practitioner)  Kala Jacome MD as Consulting Physician (General Surgery)            ASSESSMENT & PLAN    Diagnoses and all orders for this visit:    1. Cigarette nicotine dependence without complication (Primary)  Assessment & Plan:  Smoking 3/4 ppd currently, side effects and administration of Chantix discussed at length, discussed patient will begin with starting pack, and taper up, then will start her continuation pack.  Discussed picking a quit date, we will follow-up in 2 months for reevaluation    Orders:  -     Chantix Starting Month Daniel 0.5 MG X 11 & 1 MG X 42 tablet therapy pack; Take 0.5 mg by mouth Daily for 3 days, THEN 0.5 mg 2 (Two) Times a Day for 4 days, THEN 1 mg 2 (Two) Times a Day for 77 days. Stop smoking after 1 week.  Take medication with food.  Dispense: 1 each; Refill: 0  -     varenicline (CHANTIX) 1 MG tablet; Take 1 tablet by mouth 2 (Two) Times a Day.  Dispense: 180 tablet; Refill: 0         Tobacco Use: High Risk (10/17/2023)    Patient History     Smoking Tobacco Use: Every Day     Smokeless Tobacco Use: Never     Passive Exposure: Current       Follow Up     Return in 2 months (on 12/17/2023), or if symptoms worsen or fail to improve.        Patient was given instructions and counseling regarding her condition or for health maintenance advice. Please see specific information pulled into the AVS if appropriate.   I have reviewed information obtained and documented by others and I have confirmed the accuracy of this documented note.    MEME Child

## 2023-10-17 NOTE — ASSESSMENT & PLAN NOTE
Smoking 3/4 ppd currently, side effects and administration of Chantix discussed at length, discussed patient will begin with starting pack, and taper up, then will start her continuation pack.  Discussed picking a quit date, we will follow-up in 2 months for reevaluation

## 2023-10-19 ENCOUNTER — TELEPHONE (OUTPATIENT)
Dept: FAMILY MEDICINE CLINIC | Facility: CLINIC | Age: 43
End: 2023-10-19
Payer: COMMERCIAL

## 2023-11-13 VITALS
RESPIRATION RATE: 18 BRPM | DIASTOLIC BLOOD PRESSURE: 74 MMHG | OXYGEN SATURATION: 100 % | TEMPERATURE: 98 F | HEIGHT: 64 IN | WEIGHT: 182.1 LBS | HEART RATE: 72 BPM | BODY MASS INDEX: 31.09 KG/M2 | SYSTOLIC BLOOD PRESSURE: 119 MMHG

## 2023-11-13 PROCEDURE — 99283 EMERGENCY DEPT VISIT LOW MDM: CPT

## 2023-11-14 ENCOUNTER — APPOINTMENT (OUTPATIENT)
Dept: GENERAL RADIOLOGY | Facility: HOSPITAL | Age: 43
End: 2023-11-14
Payer: COMMERCIAL

## 2023-11-14 ENCOUNTER — HOSPITAL ENCOUNTER (EMERGENCY)
Facility: HOSPITAL | Age: 43
Discharge: HOME | End: 2023-11-14
Attending: EMERGENCY MEDICINE | Admitting: EMERGENCY MEDICINE
Payer: COMMERCIAL

## 2023-11-14 DIAGNOSIS — M54.9 BACK PAIN, UNSPECIFIED BACK LOCATION, UNSPECIFIED BACK PAIN LATERALITY, UNSPECIFIED CHRONICITY: Primary | ICD-10-CM

## 2023-11-14 PROCEDURE — 96372 THER/PROPH/DIAG INJ SC/IM: CPT

## 2023-11-14 PROCEDURE — 25010000002 KETOROLAC TROMETHAMINE PER 15 MG

## 2023-11-14 PROCEDURE — 71101 X-RAY EXAM UNILAT RIBS/CHEST: CPT

## 2023-11-14 RX ORDER — METHOCARBAMOL 500 MG/1
500 TABLET, FILM COATED ORAL 4 TIMES DAILY
Qty: 15 TABLET | Refills: 0 | Status: SHIPPED | OUTPATIENT
Start: 2023-11-14

## 2023-11-14 RX ORDER — KETOROLAC TROMETHAMINE 15 MG/ML
15 INJECTION, SOLUTION INTRAMUSCULAR; INTRAVENOUS ONCE
Status: COMPLETED | OUTPATIENT
Start: 2023-11-14 | End: 2023-11-14

## 2023-11-14 RX ORDER — METHOCARBAMOL 750 MG/1
750 TABLET, FILM COATED ORAL ONCE
Status: COMPLETED | OUTPATIENT
Start: 2023-11-14 | End: 2023-11-14

## 2023-11-14 RX ADMIN — METHOCARBAMOL TABLETS 750 MG: 750 TABLET, COATED ORAL at 00:51

## 2023-11-14 RX ADMIN — KETOROLAC TROMETHAMINE 15 MG: 15 INJECTION, SOLUTION INTRAMUSCULAR; INTRAVENOUS at 00:44

## 2023-11-14 NOTE — DISCHARGE INSTRUCTIONS
Read and follow educational instructions provided to you in discharge packet. If symptoms worsen or fail to improve as anticipated return to ER. Patient agrees to treatment plan.

## 2023-11-14 NOTE — ED PROVIDER NOTES
Time: 12:38 AM EST  Date of encounter:  11/13/2023  Independent Historian/Clinical History and Information was obtained by:   Patient  Chief Complaint: Back pain    History is limited by: N/A    History of Present Illness:  Patient is a 43 y.o. year old female who presents to the emergency department for evaluation of upper left back pain which has been going on for 2 weeks which is improving. Patient reports that pain is worse with deep breathing and any upper arm movements. Patient denies injury.    HPI    Patient Care Team  Primary Care Provider: Veronica Liang APRN    Past Medical History:     No Known Allergies  Past Medical History:   Diagnosis Date    Anxiety     Arthritis     Depression     Foot pain, right     Forgetfulness     Heel pain     Hemorrhoids     Hidradenitis suppurativa     Ingrown toenail     Migraines     Numbness in feet      Past Surgical History:   Procedure Laterality Date    D & C CERVICAL BIOPSY      OTHER SURGICAL HISTORY      Arm surgery    OTHER SURGICAL HISTORY      Excision, sweat glands, inguinal region    TUBAL ABDOMINAL LIGATION       Family History   Problem Relation Age of Onset    Cancer Mother         Unspecified    Arthritis Mother     Diabetes Mother     Cancer Maternal Grandfather         Thyroid pancreatic    Diabetes Other     Diabetes Paternal Grandmother        Home Medications:  Prior to Admission medications    Medication Sig Start Date End Date Taking? Authorizing Provider   amphetamine-dextroamphetamine (ADDERALL) 20 MG tablet Take 1 tablet by mouth 2 (Two) Times a Day. 10/9/23   Akosua Martínez MD   celecoxib (CeleBREX) 100 MG capsule Take 1 capsule by mouth 2 (Two) Times a Day As Needed for Mild Pain. 7/24/23   Veronica Liang APRN   Chantix Starting Month Daniel 0.5 MG X 11 & 1 MG X 42 tablet therapy pack Take 0.5 mg by mouth Daily for 3 days, THEN 0.5 mg 2 (Two) Times a Day for 4 days, THEN 1 mg 2 (Two) Times a Day for 77 days. Stop smoking after  1 week.  Take medication with food. 10/17/23 1/8/24  Veronica Liang APRN   escitalopram (LEXAPRO) 10 MG tablet Take 1 tablet by mouth Daily. 7/24/23   Veronica Liang APRN   ferrous sulfate (FeroSul) 325 (65 FE) MG tablet Take 1 tablet by mouth Daily With Breakfast. 7/24/23   Veronica Liang APRN   Folic Acid (FOLATE PO) Take  by mouth.    Provider, MD Lloyd   Prenatal Vit-Fe Fumarate-FA (WesTab Plus) 27-1 MG tablet Take 1 tablet by mouth Daily. 7/24/23   Veronica Liang APRN   topiramate (Topamax) 50 MG tablet Take 1 tablet by mouth 2 (Two) Times a Day. 7/24/23   Veronica Liang APRN   varenicline (CHANTIX) 1 MG tablet Take 1 tablet by mouth 2 (Two) Times a Day. 10/17/23   Veronica Liang APRN   cefdinir (OMNICEF) 300 MG capsule Take 1 capsule by mouth 2 (Two) Times a Day.  Patient not taking: Reported on 10/17/2023 9/11/23 11/14/23  Veronica Liang APRN   fluconazole (Diflucan) 150 MG tablet One tablet PO now, repeat one tab PO in 72 hours  Patient not taking: Reported on 9/21/2023 9/11/23 11/14/23  Veronica Liang APRN        Social History:   Social History     Tobacco Use    Smoking status: Every Day     Packs/day: 0.75     Years: 15.00     Additional pack years: 0.00     Total pack years: 11.25     Types: Cigarettes     Passive exposure: Current    Smokeless tobacco: Never   Vaping Use    Vaping Use: Some days    Substances: Nicotine    Devices: Disposable   Substance Use Topics    Alcohol use: Not Currently     Comment: occasionally    Drug use: Not Currently         Review of Systems:  Review of Systems   Constitutional:  Negative for chills and fever.   HENT:  Negative for congestion, ear pain and sore throat.    Eyes:  Negative for pain.   Respiratory:  Negative for cough, chest tightness and shortness of breath.    Cardiovascular:  Negative for chest pain.   Gastrointestinal:  Negative for abdominal pain, diarrhea, nausea and vomiting.   Genitourinary:   "Negative for flank pain and hematuria.   Musculoskeletal:  Positive for back pain. Negative for joint swelling.   Skin:  Negative for pallor.   Neurological:  Negative for seizures and headaches.   All other systems reviewed and are negative.         Physical Exam:  /74 (BP Location: Left arm, Patient Position: Sitting)   Pulse 72   Temp 98 °F (36.7 °C) (Oral)   Resp 18   Ht 161.3 cm (63.5\")   Wt 82.6 kg (182 lb 1.6 oz)   LMP 11/06/2023   SpO2 100%   BMI 31.75 kg/m²     Physical Exam  Vitals and nursing note reviewed.   Constitutional:       General: She is not in acute distress.     Appearance: Normal appearance. She is not toxic-appearing.   HENT:      Head: Normocephalic and atraumatic.      Mouth/Throat:      Mouth: Mucous membranes are moist.   Eyes:      General: No scleral icterus.  Cardiovascular:      Rate and Rhythm: Normal rate and regular rhythm.      Pulses: Normal pulses.      Heart sounds: Normal heart sounds.   Pulmonary:      Effort: Pulmonary effort is normal. No respiratory distress.      Breath sounds: Normal breath sounds.   Abdominal:      Tenderness: There is no abdominal tenderness.   Musculoskeletal:         General: Normal range of motion.      Cervical back: Normal range of motion and neck supple.      Thoracic back: Tenderness present.      Comments: Mild tenderness on palpation of the upper left back   Skin:     General: Skin is warm and dry.   Neurological:      Mental Status: She is alert and oriented to person, place, and time. Mental status is at baseline.   Psychiatric:         Judgment: Judgment normal.         Vital signs were reviewed under triage note.            Procedures:  Procedures      Medical Decision Making:      Comorbidities that affect care:    Anxiety, depression, migraine    External Notes reviewed:    10/17/2023 primary care visit for smoking sensation      The following orders were placed and all results were independently analyzed by me:  Orders " Placed This Encounter   Procedures    XR Ribs Left With PA Chest       Medications Given in the Emergency Department:  Medications   ketorolac (TORADOL) injection 15 mg (15 mg Intramuscular Given 11/14/23 0044)   methocarbamol (ROBAXIN) tablet 750 mg (750 mg Oral Given 11/14/23 0051)        ED Course:        Labs:    Lab Results (last 24 hours)       ** No results found for the last 24 hours. **             Imaging:    XR Ribs Left With PA Chest    Result Date: 11/14/2023  PROCEDURE: XR RIBS LEFT W PA CHEST  (SIX VIEWS)  COMPARISON: 11/27/2022.  INDICATIONS: LEFT-SIDED RIB PAIN; NO INJURY.  FINDINGS:  LUNGS: No acute infiltrate.  Mild biapical pleural-parenchymal scarring is possible. VASCULATURE: There may be mild aortic atherosclerotic change. CARDIAC: Normal.  No cardiac silhouette abnormality or cardiomegaly.  MEDIASTINUM: Normal.  No visible mass or adenopathy.  PLEURA: Normal.  No pleural effusion or pleural thickening.  No pneumothorax. BONES: No acute displaced fracture.  No aggressive osseous lesion. OTHER: Negative.        No acute displaced left-sided rib fracture is appreciated.  No pneumothorax.     Please note that portions of this note were completed with a voice recognition program.  DAMARIS ISLAS JR, MD       Electronically Signed and Approved By: DAMARIS ISLAS JR, MD on 11/14/2023 at 2:28                 Differential Diagnosis and Discussion:    Back Pain: The patient presents with back pain. My differential diagnosis includes but is not limited to acute spinal epidural abscess, acute spinal epidural bleed, cauda equina syndrome, abdominal aortic aneurysm, aortic dissection, kidney stone, pyelonephritis, musculoskeletal back pain, spinal fracture, and osteoarthritis.     All X-rays impressions were independently interpreted by me.  No rib fractures or pneumothorax    MDM     Amount and/or Complexity of Data Reviewed  Tests in the radiology section of CPT®: reviewed    Risk of Complications,  Morbidity, and/or Mortality  Presenting problems: low  Diagnostic procedures: low  Management options: low           Patient Care Considerations:    NARCOTICS: I considered prescribing opiate pain medication as an outpatient, however pain is controlled      Consultants/Shared Management Plan:      This medical record created using voice recognition software.             Lucita Teresa, APRN  11/14/23 0241

## 2023-11-22 ENCOUNTER — OFFICE VISIT (OUTPATIENT)
Dept: SLEEP MEDICINE | Facility: HOSPITAL | Age: 43
End: 2023-11-22
Payer: COMMERCIAL

## 2023-11-22 VITALS
HEIGHT: 64 IN | WEIGHT: 184.5 LBS | OXYGEN SATURATION: 100 % | SYSTOLIC BLOOD PRESSURE: 113 MMHG | DIASTOLIC BLOOD PRESSURE: 72 MMHG | HEART RATE: 88 BPM | BODY MASS INDEX: 31.5 KG/M2

## 2023-11-22 DIAGNOSIS — G47.419 NARCOLEPSY WITHOUT CATAPLEXY: Primary | ICD-10-CM

## 2023-11-22 PROCEDURE — G0463 HOSPITAL OUTPT CLINIC VISIT: HCPCS

## 2023-11-22 RX ORDER — DEXTROAMPHETAMINE SACCHARATE, AMPHETAMINE ASPARTATE, DEXTROAMPHETAMINE SULFATE AND AMPHETAMINE SULFATE 5; 5; 5; 5 MG/1; MG/1; MG/1; MG/1
20 TABLET ORAL 2 TIMES DAILY
Qty: 60 TABLET | Refills: 0 | Status: SHIPPED | OUTPATIENT
Start: 2023-11-22

## 2023-11-22 NOTE — PROGRESS NOTES
"  [address] [address]  Phone: [phone]  Fax: [phone]      SLEEP CLINIC FOLLOW UP PROGRESS NOTE.    Sandra Dhillon  1980  43 y.o.  female      PCP: Veronica Liang APRN      Date of visit: 11/22/2023    Chief Complaint   Patient presents with    Daytime Sleepiness   Narcolepsy without cataplexy    HPI:  This is a 43 y.o. years old patient is here for the management of narcolepsy without cataplexy.  She underwent a polysomnography on February 9, 2023 followed by MSLT which showed shortened sleep latency of 4 minutes and 12 seconds with 3 REM sleep onset out of 5 naps.  She was put on modafinil.  Today patient is here for follow-up reports that it has not helped her much.  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  She feels tired.     Bedtime midnight  Wake time 6 AM, she has to take her son to school  Number of times awakenings at night 1-2      Mediactions and allergies are reviewed by me and documented in the encounter.       SOCIAL ( habits pertaining to sleep medicine)  History of smoking:Yes   History of alcohol use: 2 per week  Caffeine use: 2     REVIEW OF SYSTEMS:   [place] Sleepiness Scale :Total score: 23   Morining headache:No   Anxiety:No   Depression:Yes    PHYSICAL EXAMINATION:  CONSTITUTIONAL:  Vitals:    11/22/23 0900   BP: 113/72   Pulse: 88   SpO2: 100%   Weight: 83.7 kg (184 lb 8 oz)   Height: 161.3 cm (63.5\")    Body mass index is 32.17 kg/m².   NOSE: nasal passages are clear, no nasal polyps, septum in the midline.  THROAT: throat is clear,   RESP SYSTEM: Breath sounds are normal, no wheezes or crackles  CARDIOVASULAR: Heart rate is regular without murmur. No edema    Sergei checked on PDMP    ASSESSMENT AND PLAN:  Narcolepsy without cataplexy G 47.419.  The modafinil is not efficacious.  It has been discontinued and we will need to put her on Adderall 20 mg twice a day.  A drug " agreement is made today and a prescription is sent to floor pharmacy.  I have instructed her that she has to call the sleep center for refills.  I have also encouraged her to take a nap when possible.  Obesity, 1 with BMI is Body mass index is 32.17 kg/m².. I have discuss the relationship between the weight and sleep apnea. The benefit of weight loss in reducing severity of sleep apnea was discussed. Discussed diet and exercise with the patient to archive ideal BMI.  Return in about 6 months (around 5/22/2024) for Next scheduled follow-up. . Patient's questions were answered.        Akosua Martínez MD  Sleep Medicine.  Medical Director, [facility], [facility]  11/22/2023 ,

## 2023-12-20 DIAGNOSIS — G47.419 NARCOLEPSY WITHOUT CATAPLEXY: ICD-10-CM

## 2023-12-20 RX ORDER — DEXTROAMPHETAMINE SACCHARATE, AMPHETAMINE ASPARTATE, DEXTROAMPHETAMINE SULFATE AND AMPHETAMINE SULFATE 5; 5; 5; 5 MG/1; MG/1; MG/1; MG/1
20 TABLET ORAL 2 TIMES DAILY
Qty: 60 TABLET | Refills: 0 | Status: SHIPPED | OUTPATIENT
Start: 2023-12-22

## 2024-01-09 ENCOUNTER — TELEPHONE (OUTPATIENT)
Dept: SLEEP MEDICINE | Facility: HOSPITAL | Age: 44
End: 2024-01-09
Payer: COMMERCIAL

## 2024-01-09 DIAGNOSIS — G47.419 NARCOLEPSY WITHOUT CATAPLEXY: ICD-10-CM

## 2024-01-09 RX ORDER — DEXTROAMPHETAMINE SACCHARATE, AMPHETAMINE ASPARTATE, DEXTROAMPHETAMINE SULFATE AND AMPHETAMINE SULFATE 5; 5; 5; 5 MG/1; MG/1; MG/1; MG/1
20 TABLET ORAL 2 TIMES DAILY
Qty: 60 TABLET | Refills: 0 | Status: SHIPPED | OUTPATIENT
Start: 2024-01-09 | End: 2024-01-15 | Stop reason: SDUPTHER

## 2024-01-09 NOTE — TELEPHONE ENCOUNTER
Sandra Dhillon 1980 requesting med refill on adderall 20mg #60, pt is still very tired during the day and would like her dosage to be increased if possible.

## 2024-01-15 ENCOUNTER — TELEPHONE (OUTPATIENT)
Dept: SLEEP MEDICINE | Facility: HOSPITAL | Age: 44
End: 2024-01-15
Payer: COMMERCIAL

## 2024-01-15 DIAGNOSIS — G47.419 NARCOLEPSY WITHOUT CATAPLEXY: ICD-10-CM

## 2024-01-15 RX ORDER — DEXTROAMPHETAMINE SACCHARATE, AMPHETAMINE ASPARTATE, DEXTROAMPHETAMINE SULFATE AND AMPHETAMINE SULFATE 5; 5; 5; 5 MG/1; MG/1; MG/1; MG/1
20 TABLET ORAL 2 TIMES DAILY
Qty: 60 TABLET | Refills: 0 | Status: SHIPPED | OUTPATIENT
Start: 2024-01-15

## 2024-01-15 NOTE — TELEPHONE ENCOUNTER
Pt is reporting that the 2 adderall a day is not keeping her awake and would like to be prescribed 3 per day.  Does she need appt to raise dosage?

## 2024-01-17 ENCOUNTER — TELEPHONE (OUTPATIENT)
Dept: SLEEP MEDICINE | Facility: HOSPITAL | Age: 44
End: 2024-01-17
Payer: COMMERCIAL

## 2024-01-17 NOTE — TELEPHONE ENCOUNTER
Dr. Martínez has prescribed Wakix for this pt.  She will fill her script for this month to get the adderall while we are getting the PA approval from her insurance.  I did let the pt know by phone and I will let her know when it has been approved.  Pt is agreeable to continue adderall until Wakix is approved for her.

## 2024-01-25 ENCOUNTER — OFFICE VISIT (OUTPATIENT)
Dept: FAMILY MEDICINE CLINIC | Facility: CLINIC | Age: 44
End: 2024-01-25
Payer: COMMERCIAL

## 2024-01-25 VITALS
SYSTOLIC BLOOD PRESSURE: 119 MMHG | HEIGHT: 65 IN | BODY MASS INDEX: 33.82 KG/M2 | OXYGEN SATURATION: 100 % | DIASTOLIC BLOOD PRESSURE: 77 MMHG | HEART RATE: 57 BPM | WEIGHT: 203 LBS

## 2024-01-25 DIAGNOSIS — M19.90 OSTEOARTHRITIS, UNSPECIFIED OSTEOARTHRITIS TYPE, UNSPECIFIED SITE: ICD-10-CM

## 2024-01-25 DIAGNOSIS — Z12.11 COLON CANCER SCREENING: ICD-10-CM

## 2024-01-25 DIAGNOSIS — D50.9 IRON DEFICIENCY ANEMIA, UNSPECIFIED IRON DEFICIENCY ANEMIA TYPE: ICD-10-CM

## 2024-01-25 DIAGNOSIS — G43.809 OTHER MIGRAINE WITHOUT STATUS MIGRAINOSUS, NOT INTRACTABLE: ICD-10-CM

## 2024-01-25 DIAGNOSIS — F41.9 ANXIETY: Primary | ICD-10-CM

## 2024-01-25 RX ORDER — FERROUS SULFATE 325(65) MG
1 TABLET ORAL
Qty: 90 TABLET | Refills: 1 | Status: SHIPPED | OUTPATIENT
Start: 2024-01-25

## 2024-01-25 RX ORDER — CELECOXIB 100 MG/1
100 CAPSULE ORAL 2 TIMES DAILY PRN
Qty: 180 CAPSULE | Refills: 1 | Status: SHIPPED | OUTPATIENT
Start: 2024-01-25

## 2024-01-25 RX ORDER — ESCITALOPRAM OXALATE 20 MG/1
20 TABLET ORAL DAILY
Qty: 90 TABLET | Refills: 1 | Status: SHIPPED | OUTPATIENT
Start: 2024-01-25

## 2024-01-25 RX ORDER — TOPIRAMATE 50 MG/1
50 TABLET, FILM COATED ORAL 2 TIMES DAILY
Qty: 180 TABLET | Refills: 1 | Status: SHIPPED | OUTPATIENT
Start: 2024-01-25

## 2024-01-25 NOTE — PROGRESS NOTES
Chief Complaint  Anxiety and Migraine    SUBJECTIVE  Sandra Dhillon presents to [facility] for 6 month follow up. Pt would like to discuss other options for anxiety, wants to know if can increase her Lexapro.  Patient states since she has had a lot of extra stressors in life plus quitting smoking has caused to be increasing her anxiety.    Pt states she has quit smoking, quit smoking consistently about 2 weeks ago, is doing well with the chantix,  states her breast seems to have improved, needs to f/u with surgeon and had the surgery to repair.       History of Present Illness  Past Medical History:   Diagnosis Date    Anxiety     Arthritis     Asthma     Depression     Foot pain, right     Forgetfulness     Heel pain     Hemorrhoids     Hidradenitis suppurativa     Ingrown toenail     Migraines     Numbness in feet       Family History   Problem Relation Age of Onset    Cancer Mother         Unspecified    Arthritis Mother     Diabetes Mother     Cancer Maternal Grandfather         Thyroid pancreatic    Diabetes Other     Diabetes Paternal Grandmother       Past Surgical History:   Procedure Laterality Date    D & C CERVICAL BIOPSY      OTHER SURGICAL HISTORY      Arm surgery    OTHER SURGICAL HISTORY      Excision, sweat glands, inguinal region    TUBAL ABDOMINAL LIGATION          Current Outpatient Medications:     amphetamine-dextroamphetamine (ADDERALL) 20 MG tablet, Take 1 tablet by mouth 2 (Two) Times a Day., Disp: 60 tablet, Rfl: 0    celecoxib (CeleBREX) 100 MG capsule, Take 1 capsule by mouth 2 (Two) Times a Day As Needed for Mild Pain., Disp: 180 capsule, Rfl: 1    escitalopram (LEXAPRO) 20 MG tablet, Take 1 tablet by mouth Daily., Disp: 90 tablet, Rfl: 1    ferrous sulfate (FeroSul) 325 (65 FE) MG tablet, Take 1 tablet by mouth Daily With Breakfast., Disp: 90 tablet, Rfl: 1    Folic Acid (FOLATE PO), Take  by mouth., Disp: , Rfl:     methocarbamol (ROBAXIN) 500 MG tablet,  "Take 1 tablet by mouth 4 (Four) Times a Day., Disp: 15 tablet, Rfl: 0    Prenatal Vit-Fe Fumarate-FA (WesTab Plus) 27-1 MG tablet, Take 1 tablet by mouth Daily., Disp: 90 tablet, Rfl: 1    topiramate (Topamax) 50 MG tablet, Take 1 tablet by mouth 2 (Two) Times a Day., Disp: 180 tablet, Rfl: 1    varenicline (CHANTIX) 1 MG tablet, Take 1 tablet by mouth 2 (Two) Times a Day., Disp: 180 tablet, Rfl: 0    OBJECTIVE  Vital Signs:   /77   Pulse 57   Ht 165.9 cm (65.3\")   Wt 92.1 kg (203 lb)   SpO2 100%   BMI 33.47 kg/m²    Estimated body mass index is 33.47 kg/m² as calculated from the following:    Height as of this encounter: 165.9 cm (65.3\").    Weight as of this encounter: 92.1 kg (203 lb).     Wt Readings from Last 3 Encounters:   01/25/24 92.1 kg (203 lb)   11/22/23 83.7 kg (184 lb 8 oz)   11/13/23 82.6 kg (182 lb 1.6 oz)     BP Readings from Last 3 Encounters:   01/25/24 119/77   11/22/23 113/72   11/13/23 119/74       Physical Exam  Vitals reviewed.   Constitutional:       Appearance: Normal appearance. She is well-developed.   HENT:      Head: Normocephalic and atraumatic.      Right Ear: External ear normal.      Left Ear: External ear normal.   Eyes:      Conjunctiva/sclera: Conjunctivae normal.      Pupils: Pupils are equal, round, and reactive to light.   Cardiovascular:      Rate and Rhythm: Normal rate and regular rhythm.      Heart sounds: No murmur heard.     No friction rub. No gallop.   Pulmonary:      Effort: Pulmonary effort is normal.      Breath sounds: Normal breath sounds. No wheezing or rhonchi.   Skin:     General: Skin is warm and dry.   Neurological:      Mental Status: She is alert and oriented to person, place, and time.      Cranial Nerves: No cranial nerve deficit.   Psychiatric:         Mood and Affect: Mood and affect normal.         Behavior: Behavior normal.         Thought Content: Thought content normal.         Judgment: Judgment normal.          Result Review    CMP  "         2/2/2023    10:51 9/6/2023    11:11   CMP   Glucose 86  80    BUN 4  5    Creatinine 0.57  0.61    EGFR 115.1  113.2    Sodium 139  138    Potassium 3.9  4.3    Chloride 102  105    Calcium 9.4  9.2    Total Protein 7.3  7.4    Albumin 4.0  4.0    Globulin 3.3  3.4    Total Bilirubin 0.3  0.2    Alkaline Phosphatase 129  100    AST (SGOT) 19  12    ALT (SGPT) 16  9    Albumin/Globulin Ratio 1.2  1.2    BUN/Creatinine Ratio 7.0  8.2    Anion Gap 8.0  8.0      Lipid Panel          9/6/2023    11:11   Lipid Panel   Total Cholesterol 149    Triglycerides 82    HDL Cholesterol 38    VLDL Cholesterol 16    LDL Cholesterol  95    LDL/HDL Ratio 2.49      TSH          9/6/2023    11:11   TSH   TSH 1.610                  Lab Results   Component Value Date    FREET4 1.01 09/09/2021     Lab Results   Component Value Date    BQESPCWE82 417 09/09/2021       XR Ribs Left With PA Chest    Result Date: 11/14/2023   No acute displaced left-sided rib fracture is appreciated.  No pneumothorax.     Please note that portions of this note were completed with a voice recognition program.  DAMARIS ISLAS JR, MD       Electronically Signed and Approved By: DAMARIS ISLAS JR, MD on 11/14/2023 at 2:28                 The above data has been reviewed by MEME Child 01/25/2024 11:58 EST.          Patient Care Team:  Veronica Liang APRN as PCP - General (Nurse Practitioner)  Kala Jacome MD as Consulting Physician (General Surgery)            ASSESSMENT & PLAN    Diagnoses and all orders for this visit:    1. Anxiety (Primary)  Assessment & Plan:  increaSED ANXIETY CURRENTLY     Orders:  -     escitalopram (LEXAPRO) 20 MG tablet; Take 1 tablet by mouth Daily.  Dispense: 90 tablet; Refill: 1    2. Colon cancer screening  Comments:  Denies any family history of colorectal cancer, problems with rectal bleeding, previous abnormal colonoscopy  Orders:  -     Cologuard - Stool, Per Rectum; Future    3. Osteoarthritis,  unspecified osteoarthritis type, unspecified site  Overview:  Symptoms controlled with as needed use of Celebrex, continue current dose    Orders:  -     celecoxib (CeleBREX) 100 MG capsule; Take 1 capsule by mouth 2 (Two) Times a Day As Needed for Mild Pain.  Dispense: 180 capsule; Refill: 1    4. Iron deficiency anemia, unspecified iron deficiency anemia type  Overview:  Well-controlled with ferrous sulfate, continue current dose    Orders:  -     ferrous sulfate (FeroSul) 325 (65 FE) MG tablet; Take 1 tablet by mouth Daily With Breakfast.  Dispense: 90 tablet; Refill: 1    5. Other migraine without status migrainosus, not intractable  Overview:  Stable and well-controlled Topamax, continue current dose    Orders:  -     topiramate (Topamax) 50 MG tablet; Take 1 tablet by mouth 2 (Two) Times a Day.  Dispense: 180 tablet; Refill: 1         Tobacco Use: High Risk (1/25/2024)    Patient History     Smoking Tobacco Use: Every Day     Smokeless Tobacco Use: Never     Passive Exposure: Current       Follow Up     Return if symptoms worsen or fail to improve.        Patient was given instructions and counseling regarding her condition or for health maintenance advice. Please see specific information pulled into the AVS if appropriate.   I have reviewed information obtained and documented by others and I have confirmed the accuracy of this documented note.    MEME Child

## 2024-01-26 ENCOUNTER — TELEPHONE (OUTPATIENT)
Dept: SLEEP MEDICINE | Facility: HOSPITAL | Age: 44
End: 2024-01-26
Payer: COMMERCIAL

## 2024-01-26 NOTE — TELEPHONE ENCOUNTER
Spoke with Carolina herzog [facility], she sees that [facility] is trying to reach pt so she asked if I could contact pt with a number for [employer].    Call to pt, gave her the [phone] number for [employer], she will call them today to set up shipment details.

## 2024-02-09 ENCOUNTER — TELEPHONE (OUTPATIENT)
Dept: SLEEP MEDICINE | Facility: HOSPITAL | Age: 44
End: 2024-02-09
Payer: COMMERCIAL

## 2024-02-09 NOTE — TELEPHONE ENCOUNTER
Prior auth case ID: [id number] is approved for Wakix 17.8mg #60 from 1/23/24 thru 1/22/25.  Pts first shipment was sent out 1/31/24.

## 2024-04-05 DIAGNOSIS — F41.9 ANXIETY: ICD-10-CM

## 2024-04-08 RX ORDER — ESCITALOPRAM OXALATE 10 MG/1
10 TABLET ORAL DAILY
Qty: 90 TABLET | Refills: 0 | OUTPATIENT
Start: 2024-04-08

## 2024-04-24 DIAGNOSIS — F41.9 ANXIETY: ICD-10-CM

## 2024-04-24 DIAGNOSIS — G47.419 NARCOLEPSY WITHOUT CATAPLEXY: ICD-10-CM

## 2024-04-24 RX ORDER — ESCITALOPRAM OXALATE 20 MG/1
20 TABLET ORAL DAILY
Qty: 90 TABLET | Refills: 1 | Status: SHIPPED | OUTPATIENT
Start: 2024-04-24 | End: 2024-04-24

## 2024-04-24 RX ORDER — DEXTROAMPHETAMINE SACCHARATE, AMPHETAMINE ASPARTATE, DEXTROAMPHETAMINE SULFATE AND AMPHETAMINE SULFATE 5; 5; 5; 5 MG/1; MG/1; MG/1; MG/1
20 TABLET ORAL 2 TIMES DAILY
Qty: 60 TABLET | Refills: 0 | Status: SHIPPED | OUTPATIENT
Start: 2024-04-24

## 2024-05-05 DIAGNOSIS — D50.9 IRON DEFICIENCY ANEMIA, UNSPECIFIED IRON DEFICIENCY ANEMIA TYPE: ICD-10-CM

## 2024-05-06 RX ORDER — PNV,CALCIUM 72/IRON/FOLIC ACID 27 MG-1 MG
1 TABLET ORAL DAILY
Qty: 90 TABLET | Refills: 1 | Status: SHIPPED | OUTPATIENT
Start: 2024-05-06

## 2024-05-22 ENCOUNTER — OFFICE VISIT (OUTPATIENT)
Dept: SLEEP MEDICINE | Facility: HOSPITAL | Age: 44
End: 2024-05-22
Payer: COMMERCIAL

## 2024-05-22 VITALS
DIASTOLIC BLOOD PRESSURE: 63 MMHG | HEIGHT: 64 IN | OXYGEN SATURATION: 97 % | BODY MASS INDEX: 35 KG/M2 | WEIGHT: 205 LBS | HEART RATE: 68 BPM | SYSTOLIC BLOOD PRESSURE: 95 MMHG

## 2024-05-22 DIAGNOSIS — G47.419 NARCOLEPSY WITHOUT CATAPLEXY: Primary | ICD-10-CM

## 2024-05-22 PROCEDURE — 1159F MED LIST DOCD IN RCRD: CPT | Performed by: INTERNAL MEDICINE

## 2024-05-22 PROCEDURE — G0463 HOSPITAL OUTPT CLINIC VISIT: HCPCS

## 2024-05-22 PROCEDURE — 1160F RVW MEDS BY RX/DR IN RCRD: CPT | Performed by: INTERNAL MEDICINE

## 2024-05-22 PROCEDURE — 99214 OFFICE O/P EST MOD 30 MIN: CPT | Performed by: INTERNAL MEDICINE

## 2024-05-22 RX ORDER — DEXTROAMPHETAMINE SACCHARATE, AMPHETAMINE ASPARTATE, DEXTROAMPHETAMINE SULFATE AND AMPHETAMINE SULFATE 5; 5; 5; 5 MG/1; MG/1; MG/1; MG/1
20 TABLET ORAL 2 TIMES DAILY
Qty: 60 TABLET | Refills: 0 | Status: SHIPPED | OUTPATIENT
Start: 2024-05-22

## 2024-05-22 RX ORDER — ESCITALOPRAM OXALATE 20 MG/1
20 TABLET ORAL
COMMUNITY
Start: 2024-01-25

## 2024-05-22 NOTE — PROGRESS NOTES
"  [address] [address]  Phone: [phone]  Fax: [phone]      SLEEP CLINIC FOLLOW UP PROGRESS NOTE.    Sandra Dhillon  1980  43 y.o.  female      PCP: Veronica Liang APRN      Date of visit: 5/22/2024    Chief Complaint   Patient presents with    Daytime Sleepiness   Narcolepsy without cataplexy    HPI:  This is a 43 y.o. years old patient is here for the management of narcolepsy without cataplexy.  She underwent a polysomnography on February 9, 2023 followed by MSLT which showed shortened sleep latency of 4 minutes and 12 seconds with 3 REM sleep onset out of 5 naps.      Initially she was put on modafinil did not work well for her.  Also tried to Wakix but gave her a terrible headache which has to be stopped  At present she is on Adderall 20 mg twice a day and fairly doing well on the sleepiness but she complains of fatigue.  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       SOCIAL ( habits pertaining to sleep medicine)  History of smoking:Yes   History of alcohol use: 2 per week  Caffeine use: 2     REVIEW OF SYSTEMS:   [place] Sleepiness Scale :Total score: 24   Morining headache:No   Anxiety:No   Depression:Yes    PHYSICAL EXAMINATION:  CONSTITUTIONAL:  Vitals:    05/22/24 0900   BP: 95/63   Pulse: 68   SpO2: 97%   Weight: 93 kg (205 lb)   Height: 161.3 cm (63.5\")    Body mass index is 35.74 kg/m².   NOSE: nasal passages are clear, no nasal polyps, septum in the midline.  THROAT: throat is clear,   RESP SYSTEM: Breath sounds are normal, no wheezes or crackles  CARDIOVASULAR: Heart rate is regular without murmur. No edema    Sergei checked on PDMP, " 5/22/2024      ASSESSMENT AND PLAN:  Narcolepsy without cataplexy G 47.419.  The modafinil is not efficacious.  It has been discontinued and we will need to put her on Adderall 20 mg twice a day.  A drug agreement is made today and a prescription is sent to floor pharmacy.  I have instructed her that she has to call the sleep center for refills.  I have also encouraged her to take a nap when possible.  Also encouraged her to increase her dose sleep time.  Obesity, 1 with BMI is Body mass index is 35.74 kg/m².. I have discuss the relationship between the weight and sleep apnea. The benefit of weight loss in reducing severity of sleep apnea was discussed. Discussed diet and exercise with the patient to archive ideal BMI.  Return in about 6 months (around 11/22/2024) for Next scheduled follow-up. . Patient's questions were answered.        Akosua Martínez MD  Sleep Medicine.  Medical Director, [facility], [facility]  5/22/2024 ,

## 2024-06-07 ENCOUNTER — TELEPHONE (OUTPATIENT)
Dept: FAMILY MEDICINE CLINIC | Facility: CLINIC | Age: 44
End: 2024-06-07
Payer: COMMERCIAL

## 2024-07-05 DIAGNOSIS — F32.A DEPRESSION, UNSPECIFIED DEPRESSION TYPE: ICD-10-CM

## 2024-07-05 DIAGNOSIS — F41.9 ANXIETY: ICD-10-CM

## 2024-07-08 RX ORDER — ESCITALOPRAM OXALATE 10 MG/1
10 TABLET ORAL DAILY
Qty: 90 TABLET | Refills: 0 | OUTPATIENT
Start: 2024-07-08

## 2024-07-25 ENCOUNTER — OFFICE VISIT (OUTPATIENT)
Dept: FAMILY MEDICINE CLINIC | Facility: CLINIC | Age: 44
End: 2024-07-25
Payer: COMMERCIAL

## 2024-07-25 ENCOUNTER — TELEPHONE (OUTPATIENT)
Dept: SLEEP MEDICINE | Facility: HOSPITAL | Age: 44
End: 2024-07-25
Payer: COMMERCIAL

## 2024-07-25 VITALS
SYSTOLIC BLOOD PRESSURE: 108 MMHG | OXYGEN SATURATION: 98 % | HEART RATE: 106 BPM | HEIGHT: 64 IN | WEIGHT: 217 LBS | BODY MASS INDEX: 37.05 KG/M2 | DIASTOLIC BLOOD PRESSURE: 77 MMHG

## 2024-07-25 DIAGNOSIS — Z13.220 LIPID SCREENING: ICD-10-CM

## 2024-07-25 DIAGNOSIS — Z13.29 THYROID DISORDER SCREEN: ICD-10-CM

## 2024-07-25 DIAGNOSIS — D50.9 IRON DEFICIENCY ANEMIA, UNSPECIFIED IRON DEFICIENCY ANEMIA TYPE: ICD-10-CM

## 2024-07-25 DIAGNOSIS — Z00.00 ANNUAL PHYSICAL EXAM: Primary | ICD-10-CM

## 2024-07-25 DIAGNOSIS — G47.419 NARCOLEPSY WITHOUT CATAPLEXY: ICD-10-CM

## 2024-07-25 DIAGNOSIS — G43.809 OTHER MIGRAINE WITHOUT STATUS MIGRAINOSUS, NOT INTRACTABLE: ICD-10-CM

## 2024-07-25 DIAGNOSIS — Z12.31 BREAST CANCER SCREENING BY MAMMOGRAM: ICD-10-CM

## 2024-07-25 DIAGNOSIS — M19.90 OSTEOARTHRITIS, UNSPECIFIED OSTEOARTHRITIS TYPE, UNSPECIFIED SITE: ICD-10-CM

## 2024-07-25 DIAGNOSIS — F17.210 CIGARETTE NICOTINE DEPENDENCE WITHOUT COMPLICATION: ICD-10-CM

## 2024-07-25 DIAGNOSIS — L73.2 HIDRADENITIS SUPPURATIVA: ICD-10-CM

## 2024-07-25 DIAGNOSIS — E66.9 CLASS 2 OBESITY WITH BODY MASS INDEX (BMI) OF 37.0 TO 37.9 IN ADULT, UNSPECIFIED OBESITY TYPE, UNSPECIFIED WHETHER SERIOUS COMORBIDITY PRESENT: ICD-10-CM

## 2024-07-25 PROBLEM — E66.812 CLASS 2 OBESITY WITH BODY MASS INDEX (BMI) OF 37.0 TO 37.9 IN ADULT: Status: ACTIVE | Noted: 2023-02-02

## 2024-07-25 PROCEDURE — 99214 OFFICE O/P EST MOD 30 MIN: CPT | Performed by: NURSE PRACTITIONER

## 2024-07-25 PROCEDURE — 99396 PREV VISIT EST AGE 40-64: CPT | Performed by: NURSE PRACTITIONER

## 2024-07-25 RX ORDER — VARENICLINE TARTRATE 0.5 (11)-1
KIT ORAL
Qty: 1 EACH | Refills: 0 | Status: SHIPPED | OUTPATIENT
Start: 2024-07-25 | End: 2024-08-22

## 2024-07-25 RX ORDER — FERROUS SULFATE 325(65) MG
1 TABLET ORAL
Qty: 90 TABLET | Refills: 1 | Status: SHIPPED | OUTPATIENT
Start: 2024-07-25

## 2024-07-25 RX ORDER — CELECOXIB 100 MG/1
100 CAPSULE ORAL 2 TIMES DAILY PRN
Qty: 180 CAPSULE | Refills: 1 | OUTPATIENT
Start: 2024-07-25

## 2024-07-25 RX ORDER — VARENICLINE TARTRATE 1 MG/1
1 TABLET, FILM COATED ORAL 2 TIMES DAILY
Qty: 60 TABLET | Refills: 1 | Status: SHIPPED | OUTPATIENT
Start: 2024-08-22

## 2024-07-25 RX ORDER — TOPIRAMATE 50 MG/1
50 TABLET, FILM COATED ORAL 2 TIMES DAILY
Qty: 180 TABLET | Refills: 1 | Status: SHIPPED | OUTPATIENT
Start: 2024-07-25

## 2024-07-25 RX ORDER — CELECOXIB 100 MG/1
100 CAPSULE ORAL 2 TIMES DAILY PRN
Qty: 180 CAPSULE | Refills: 1 | Status: SHIPPED | OUTPATIENT
Start: 2024-07-25

## 2024-07-25 RX ORDER — TOPIRAMATE 50 MG/1
50 TABLET, FILM COATED ORAL 2 TIMES DAILY
Qty: 180 TABLET | Refills: 1 | OUTPATIENT
Start: 2024-07-25

## 2024-07-25 RX ORDER — DOXYCYCLINE HYCLATE 100 MG/1
100 CAPSULE ORAL 2 TIMES DAILY
Qty: 20 CAPSULE | Refills: 0 | Status: SHIPPED | OUTPATIENT
Start: 2024-07-25

## 2024-07-25 RX ORDER — DEXTROAMPHETAMINE SACCHARATE, AMPHETAMINE ASPARTATE, DEXTROAMPHETAMINE SULFATE AND AMPHETAMINE SULFATE 5; 5; 5; 5 MG/1; MG/1; MG/1; MG/1
20 TABLET ORAL 2 TIMES DAILY
Qty: 60 TABLET | Refills: 0 | Status: SHIPPED | OUTPATIENT
Start: 2024-07-25

## 2024-07-25 NOTE — ASSESSMENT & PLAN NOTE
Patient's (Body mass index is 37.84 kg/m².) indicates that they are obese (BMI >30) with health conditions that include none . Weight is worsening. BMI  is above average; BMI management plan is completed. We discussed portion control and increasing exercise.

## 2024-07-25 NOTE — TELEPHONE ENCOUNTER
CELEBREX  LRF 01/25/2024  LOV 01/25/2024  F/U 07/25/2024    TOPAMAX  LRF 01/25/2024  LOV 01/25/2024  F/U 07/25/2024

## 2024-07-25 NOTE — ASSESSMENT & PLAN NOTE
Prescription for doxycycline, discussed with patient to hold her iron supplement while taking the doxycycline, discussed if no improvement or any worsening seek reevaluation, referral to dermatology placed.

## 2024-08-05 DIAGNOSIS — F17.210 CIGARETTE NICOTINE DEPENDENCE WITHOUT COMPLICATION: ICD-10-CM

## 2024-08-06 RX ORDER — VARENICLINE TARTRATE 0.5 (11)-1
KIT ORAL
Qty: 53 EACH | Refills: 0 | OUTPATIENT
Start: 2024-08-06

## 2024-08-15 ENCOUNTER — HOSPITAL ENCOUNTER (OUTPATIENT)
Dept: MAMMOGRAPHY | Facility: HOSPITAL | Age: 44
Discharge: HOME | End: 2024-08-15
Admitting: NURSE PRACTITIONER
Payer: COMMERCIAL

## 2024-08-15 DIAGNOSIS — R92.8 ABNORMALITY OF LEFT BREAST ON SCREENING MAMMOGRAM: Primary | ICD-10-CM

## 2024-08-15 DIAGNOSIS — Z12.31 BREAST CANCER SCREENING BY MAMMOGRAM: ICD-10-CM

## 2024-08-15 PROCEDURE — 77063 BREAST TOMOSYNTHESIS BI: CPT

## 2024-08-15 PROCEDURE — 77067 SCR MAMMO BI INCL CAD: CPT

## 2024-08-26 ENCOUNTER — TELEPHONE (OUTPATIENT)
Dept: SLEEP MEDICINE | Facility: HOSPITAL | Age: 44
End: 2024-08-26
Payer: COMMERCIAL

## 2024-08-30 ENCOUNTER — OFFICE VISIT (OUTPATIENT)
Dept: FAMILY MEDICINE CLINIC | Facility: CLINIC | Age: 44
End: 2024-08-30
Payer: COMMERCIAL

## 2024-08-30 VITALS
WEIGHT: 217.8 LBS | BODY MASS INDEX: 37.18 KG/M2 | HEART RATE: 83 BPM | SYSTOLIC BLOOD PRESSURE: 102 MMHG | RESPIRATION RATE: 16 BRPM | DIASTOLIC BLOOD PRESSURE: 71 MMHG | OXYGEN SATURATION: 98 % | HEIGHT: 64 IN

## 2024-08-30 DIAGNOSIS — L73.2 HIDRADENITIS SUPPURATIVA: ICD-10-CM

## 2024-08-30 PROCEDURE — 99214 OFFICE O/P EST MOD 30 MIN: CPT | Performed by: NURSE PRACTITIONER

## 2024-08-30 RX ORDER — IBUPROFEN 800 MG/1
800 TABLET, FILM COATED ORAL EVERY 8 HOURS PRN
Qty: 30 TABLET | Refills: 1 | Status: SHIPPED | OUTPATIENT
Start: 2024-08-30

## 2024-08-30 RX ORDER — DOXYCYCLINE 100 MG/1
CAPSULE ORAL
Qty: 50 CAPSULE | Refills: 1 | Status: SHIPPED | OUTPATIENT
Start: 2024-08-30

## 2024-08-30 RX ORDER — SPIRONOLACTONE 50 MG/1
50 TABLET, FILM COATED ORAL DAILY
Qty: 30 TABLET | Refills: 1 | Status: SHIPPED | OUTPATIENT
Start: 2024-08-30

## 2024-08-30 NOTE — PROGRESS NOTES
Chief Complaint  Hidradenitis Suppurativa    SUBJECTIVE  Sandra DORETHA Dhillon presents to [facility]     Patient presents today to follow-up on hidradenitis suppurativa, she has a long history of fairly significant disease.  Patient does have upcoming appointment with dermatology, however her symptoms are significant enough that the pain is starting to cause her difficulty to sleep.  Patient states that the area on her right inner thigh is currently the worst    Pt cleaning with Hibiclens daily, keeping covered for drainage, requesting ibuprofen for pain and anything else that we can do to help in the meantime.  Patient was previously on Humira, but states never got very good control with that, she is hoping dermatology will have some new suggestions    Pt has appt Oct 4th with dermatolgoy     History of Present Illness  Past Medical History:   Diagnosis Date    Anxiety     Arthritis     Asthma     Depression     Foot pain, right     Forgetfulness     Heel pain     Hemorrhoids     Hidradenitis suppurativa     Ingrown toenail     Migraines     Numbness in feet       Family History   Problem Relation Age of Onset    Cancer Mother         Unspecified    Arthritis Mother     Diabetes Mother     Cancer Maternal Grandfather         Thyroid pancreatic    Diabetes Other     Diabetes Paternal Grandmother     Cancer Paternal Grandfather       Past Surgical History:   Procedure Laterality Date    D & C CERVICAL BIOPSY      OTHER SURGICAL HISTORY      Arm surgery    OTHER SURGICAL HISTORY      Excision, sweat glands, inguinal region    TUBAL ABDOMINAL LIGATION          Current Outpatient Medications:     amphetamine-dextroamphetamine (ADDERALL) 20 MG tablet, Take 1 tablet by mouth 2 (Two) Times a Day., Disp: 60 tablet, Rfl: 0    celecoxib (CeleBREX) 100 MG capsule, Take 1 capsule by mouth 2 (Two) Times a Day As Needed for Mild Pain., Disp: 180 capsule, Rfl: 1    doxycycline (VIBRAMYCIN) 100 MG capsule,  "1 cap PO BID for 10 days, then QD thereafter, Disp: 50 capsule, Rfl: 1    escitalopram (LEXAPRO) 20 MG tablet, 1 tablet., Disp: , Rfl:     ferrous sulfate (FeroSul) 325 (65 FE) MG tablet, Take 1 tablet by mouth Daily With Breakfast., Disp: 90 tablet, Rfl: 1    Prenatal Vit-Fe Fumarate-FA (WesTab Plus) 27-1 MG tablet, TAKE 1 TABLET BY MOUTH DAILY, Disp: 90 tablet, Rfl: 1    topiramate (Topamax) 50 MG tablet, Take 1 tablet by mouth 2 (Two) Times a Day., Disp: 180 tablet, Rfl: 1    varenicline (CHANTIX) 1 MG tablet, Take 1 tablet by mouth 2 (Two) Times a Day., Disp: 60 tablet, Rfl: 1    ibuprofen (ADVIL,MOTRIN) 800 MG tablet, Take 1 tablet by mouth Every 8 (Eight) Hours As Needed for Moderate Pain., Disp: 30 tablet, Rfl: 1    metFORMIN (GLUCOPHAGE) 500 MG tablet, Take 1 tablet by mouth 2 (Two) Times a Day With Meals., Disp: 60 tablet, Rfl: 1    spironolactone (Aldactone) 50 MG tablet, Take 1 tablet by mouth Daily., Disp: 30 tablet, Rfl: 1    OBJECTIVE  Vital Signs:   /71   Pulse 83   Resp 16   Ht 161.3 cm (63.5\")   Wt 98.8 kg (217 lb 12.8 oz)   SpO2 98%   BMI 37.98 kg/m²    Estimated body mass index is 37.98 kg/m² as calculated from the following:    Height as of this encounter: 161.3 cm (63.5\").    Weight as of this encounter: 98.8 kg (217 lb 12.8 oz).     Wt Readings from Last 3 Encounters:   08/30/24 98.8 kg (217 lb 12.8 oz)   07/25/24 98.4 kg (217 lb)   05/22/24 93 kg (205 lb)     BP Readings from Last 3 Encounters:   08/30/24 102/71   07/25/24 108/77   05/22/24 95/63       Physical Exam  Vitals reviewed.   Constitutional:       Appearance: Normal appearance. She is well-developed.   HENT:      Head: Normocephalic and atraumatic.      Right Ear: External ear normal.      Left Ear: External ear normal.   Eyes:      Conjunctiva/sclera: Conjunctivae normal.      Pupils: Pupils are equal, round, and reactive to light.   Cardiovascular:      Rate and Rhythm: Normal rate and regular rhythm.      Heart sounds: " No murmur heard.     No friction rub. No gallop.   Pulmonary:      Effort: Pulmonary effort is normal.      Breath sounds: Normal breath sounds. No wheezing or rhonchi.   Skin:     General: Skin is warm and dry.      Comments: Right inner thigh has large area of discoloration and erythema, with multiple tracts noted and drainage, tender to palpation   Neurological:      Mental Status: She is alert and oriented to person, place, and time.      Cranial Nerves: No cranial nerve deficit.   Psychiatric:         Mood and Affect: Mood and affect normal.         Behavior: Behavior normal.         Thought Content: Thought content normal.         Judgment: Judgment normal.          Result Review        Mammo Screening Digital Tomosynthesis Bilateral With CAD    Result Date: 8/15/2024  Left breast asymmetry. Recommend further evaluation with left diagnostic mammogram and possible left breast ultrasound if needed.  BI-RADS ASSESSMENT: Category 0: Incomplete-need additional imaging evaluation and/or prior mammograms for comparison  Note: It has been reported that there is approximately a 15% false negative rate in mammography.  Therefore, management of a palpable abnormality should not be deferred because of a negative mammogram.  Electronically Signed By-Ju Li MD On:8/15/2024 3:57 PM         The above data has been reviewed by MEME Child 08/30/2024 08:38 EDT.          Patient Care Team:  Veronica Liang APRN as PCP - General (Nurse Practitioner)  Kala Jacome MD as Consulting Physician (General Surgery)            ASSESSMENT & PLAN    Diagnoses and all orders for this visit:    1. Hidradenitis suppurativa  -     doxycycline (VIBRAMYCIN) 100 MG capsule; 1 cap PO BID for 10 days, then QD thereafter  Dispense: 50 capsule; Refill: 1  -     metFORMIN (GLUCOPHAGE) 500 MG tablet; Take 1 tablet by mouth 2 (Two) Times a Day With Meals.  Dispense: 60 tablet; Refill: 1  -     spironolactone (Aldactone) 50  MG tablet; Take 1 tablet by mouth Daily.  Dispense: 30 tablet; Refill: 1  -     ibuprofen (ADVIL,MOTRIN) 800 MG tablet; Take 1 tablet by mouth Every 8 (Eight) Hours As Needed for Moderate Pain.  Dispense: 30 tablet; Refill: 1    We will treat with doxycycline twice daily for 10 days, then daily thereafter, we will also add spironolactone and metformin, patient states that she has done these medications years ago, did see a little improvement for some time, side effects administration medication discussed, patient has labs previously ordered that she has not yet completed, she will have those done today    Tobacco Use: High Risk (8/30/2024)    Patient History     Smoking Tobacco Use: Every Day     Smokeless Tobacco Use: Never     Passive Exposure: Current       Follow Up     Return if symptoms worsen or fail to improve.        Patient was given instructions and counseling regarding her condition or for health maintenance advice. Please see specific information pulled into the AVS if appropriate.   I have reviewed information obtained and documented by others and I have confirmed the accuracy of this documented note.    MEME Child

## 2024-09-03 ENCOUNTER — HOSPITAL ENCOUNTER (OUTPATIENT)
Dept: ULTRASOUND IMAGING | Facility: HOSPITAL | Age: 44
End: 2024-09-03
Payer: COMMERCIAL

## 2024-09-03 ENCOUNTER — HOSPITAL ENCOUNTER (OUTPATIENT)
Dept: MAMMOGRAPHY | Facility: HOSPITAL | Age: 44
Discharge: HOME | End: 2024-09-03
Admitting: NURSE PRACTITIONER
Payer: COMMERCIAL

## 2024-09-03 PROCEDURE — G0279 TOMOSYNTHESIS, MAMMO: HCPCS

## 2024-09-03 PROCEDURE — 77065 DX MAMMO INCL CAD UNI: CPT

## 2024-09-18 ENCOUNTER — TELEPHONE (OUTPATIENT)
Dept: FAMILY MEDICINE CLINIC | Facility: CLINIC | Age: 44
End: 2024-09-18
Payer: COMMERCIAL

## 2024-10-03 DIAGNOSIS — L73.2 HIDRADENITIS SUPPURATIVA: ICD-10-CM

## 2024-10-03 DIAGNOSIS — G47.419 NARCOLEPSY WITHOUT CATAPLEXY: ICD-10-CM

## 2024-10-04 RX ORDER — DEXTROAMPHETAMINE SACCHARATE, AMPHETAMINE ASPARTATE, DEXTROAMPHETAMINE SULFATE AND AMPHETAMINE SULFATE 5; 5; 5; 5 MG/1; MG/1; MG/1; MG/1
20 TABLET ORAL 2 TIMES DAILY
Qty: 60 TABLET | Refills: 0 | OUTPATIENT
Start: 2024-10-04

## 2024-10-04 RX ORDER — IBUPROFEN 800 MG/1
800 TABLET, FILM COATED ORAL EVERY 8 HOURS PRN
Qty: 30 TABLET | Refills: 1 | Status: SHIPPED | OUTPATIENT
Start: 2024-10-04

## 2024-10-07 DIAGNOSIS — G47.419 NARCOLEPSY WITHOUT CATAPLEXY: ICD-10-CM

## 2024-10-07 RX ORDER — DEXTROAMPHETAMINE SACCHARATE, AMPHETAMINE ASPARTATE, DEXTROAMPHETAMINE SULFATE AND AMPHETAMINE SULFATE 5; 5; 5; 5 MG/1; MG/1; MG/1; MG/1
20 TABLET ORAL 2 TIMES DAILY
Qty: 60 TABLET | Refills: 0 | Status: SHIPPED | OUTPATIENT
Start: 2024-10-07

## 2024-10-14 ENCOUNTER — LAB (OUTPATIENT)
Dept: LAB | Facility: HOSPITAL | Age: 44
End: 2024-10-14
Payer: COMMERCIAL

## 2024-10-14 ENCOUNTER — TRANSCRIBE ORDERS (OUTPATIENT)
Dept: ADMINISTRATIVE | Facility: HOSPITAL | Age: 44
End: 2024-10-14
Payer: COMMERCIAL

## 2024-10-14 DIAGNOSIS — Z13.220 LIPID SCREENING: ICD-10-CM

## 2024-10-14 DIAGNOSIS — Z00.00 ANNUAL PHYSICAL EXAM: ICD-10-CM

## 2024-10-14 DIAGNOSIS — Z79.899 ENCOUNTER FOR LONG-TERM (CURRENT) USE OF MEDICATIONS: ICD-10-CM

## 2024-10-14 DIAGNOSIS — Z79.899 ENCOUNTER FOR LONG-TERM (CURRENT) USE OF MEDICATIONS: Primary | ICD-10-CM

## 2024-10-14 DIAGNOSIS — Z13.29 THYROID DISORDER SCREEN: ICD-10-CM

## 2024-10-14 LAB
ALBUMIN SERPL-MCNC: 3.6 G/DL (ref 3.5–5.2)
ALBUMIN/GLOB SERPL: 1 G/DL
ALP SERPL-CCNC: 147 U/L (ref 39–117)
ALT SERPL W P-5'-P-CCNC: 9 U/L (ref 1–33)
ANION GAP SERPL CALCULATED.3IONS-SCNC: 14.9 MMOL/L (ref 5–15)
AST SERPL-CCNC: 12 U/L (ref 1–32)
BASOPHILS # BLD AUTO: 0.14 10*3/MM3 (ref 0–0.2)
BASOPHILS NFR BLD AUTO: 1.4 % (ref 0–1.5)
BILIRUB CONJ SERPL-MCNC: 0.1 MG/DL (ref 0–0.3)
BILIRUB SERPL-MCNC: 0.2 MG/DL (ref 0–1.2)
BUN SERPL-MCNC: 12 MG/DL (ref 6–20)
BUN/CREAT SERPL: 17.9 (ref 7–25)
CALCIUM SPEC-SCNC: 8.8 MG/DL (ref 8.6–10.5)
CHLORIDE SERPL-SCNC: 97 MMOL/L (ref 98–107)
CHOLEST SERPL-MCNC: 153 MG/DL (ref 0–200)
CO2 SERPL-SCNC: 25.1 MMOL/L (ref 22–29)
CREAT SERPL-MCNC: 0.67 MG/DL (ref 0.57–1)
DEPRECATED RDW RBC AUTO: 41.6 FL (ref 37–54)
EGFRCR SERPLBLD CKD-EPI 2021: 110 ML/MIN/1.73
EOSINOPHIL # BLD AUTO: 0.42 10*3/MM3 (ref 0–0.4)
EOSINOPHIL NFR BLD AUTO: 4.1 % (ref 0.3–6.2)
ERYTHROCYTE [DISTWIDTH] IN BLOOD BY AUTOMATED COUNT: 13.1 % (ref 12.3–15.4)
GLOBULIN UR ELPH-MCNC: 3.7 GM/DL
GLUCOSE SERPL-MCNC: 76 MG/DL (ref 65–99)
HBV SURFACE AG SERPL QL IA: NORMAL
HCT VFR BLD AUTO: 32.5 % (ref 34–46.6)
HCV AB SER QL: NORMAL
HDLC SERPL-MCNC: 43 MG/DL (ref 40–60)
HGB BLD-MCNC: 10.3 G/DL (ref 12–15.9)
IMM GRANULOCYTES # BLD AUTO: 0.03 10*3/MM3 (ref 0–0.05)
IMM GRANULOCYTES NFR BLD AUTO: 0.3 % (ref 0–0.5)
LDLC SERPL CALC-MCNC: 96 MG/DL (ref 0–100)
LDLC/HDLC SERPL: 2.23 {RATIO}
LYMPHOCYTES # BLD AUTO: 1.84 10*3/MM3 (ref 0.7–3.1)
LYMPHOCYTES NFR BLD AUTO: 18 % (ref 19.6–45.3)
MCH RBC QN AUTO: 27.9 PG (ref 26.6–33)
MCHC RBC AUTO-ENTMCNC: 31.7 G/DL (ref 31.5–35.7)
MCV RBC AUTO: 88.1 FL (ref 79–97)
MONOCYTES # BLD AUTO: 0.67 10*3/MM3 (ref 0.1–0.9)
MONOCYTES NFR BLD AUTO: 6.6 % (ref 5–12)
NEUTROPHILS NFR BLD AUTO: 69.6 % (ref 42.7–76)
NEUTROPHILS NFR BLD AUTO: 7.1 10*3/MM3 (ref 1.7–7)
NRBC BLD AUTO-RTO: 0 /100 WBC (ref 0–0.2)
PLATELET # BLD AUTO: 377 10*3/MM3 (ref 140–450)
PMV BLD AUTO: 11.8 FL (ref 6–12)
POTASSIUM SERPL-SCNC: 3.9 MMOL/L (ref 3.5–5.2)
PROT SERPL-MCNC: 7.3 G/DL (ref 6–8.5)
RBC # BLD AUTO: 3.69 10*6/MM3 (ref 3.77–5.28)
SODIUM SERPL-SCNC: 137 MMOL/L (ref 136–145)
TRIGL SERPL-MCNC: 70 MG/DL (ref 0–150)
TSH SERPL DL<=0.05 MIU/L-ACNC: 1.82 UIU/ML (ref 0.27–4.2)
VLDLC SERPL-MCNC: 14 MG/DL (ref 5–40)
WBC NRBC COR # BLD AUTO: 10.2 10*3/MM3 (ref 3.4–10.8)

## 2024-10-14 PROCEDURE — 86480 TB TEST CELL IMMUN MEASURE: CPT

## 2024-10-14 PROCEDURE — 86803 HEPATITIS C AB TEST: CPT

## 2024-10-14 PROCEDURE — 80061 LIPID PANEL: CPT

## 2024-10-14 PROCEDURE — 86707 HEPATITIS BE ANTIBODY: CPT

## 2024-10-14 PROCEDURE — 82248 BILIRUBIN DIRECT: CPT

## 2024-10-14 PROCEDURE — 87340 HEPATITIS B SURFACE AG IA: CPT

## 2024-10-14 PROCEDURE — 36415 COLL VENOUS BLD VENIPUNCTURE: CPT

## 2024-10-14 PROCEDURE — 80050 GENERAL HEALTH PANEL: CPT

## 2024-10-14 PROCEDURE — 86704 HEP B CORE ANTIBODY TOTAL: CPT

## 2024-10-15 LAB
HBV CORE AB SERPL QL IA: NEGATIVE
HBV E AB SERPL QL IA: NON REACTIVE

## 2024-10-16 DIAGNOSIS — R74.8 ELEVATED ALKALINE PHOSPHATASE LEVEL: ICD-10-CM

## 2024-10-16 DIAGNOSIS — D64.9 ANEMIA, UNSPECIFIED TYPE: Primary | ICD-10-CM

## 2024-10-16 LAB
GAMMA INTERFERON BACKGROUND BLD IA-ACNC: 0.04 IU/ML
M TB IFN-G BLD-IMP: NEGATIVE
M TB IFN-G CD4+ BCKGRND COR BLD-ACNC: 0.04 IU/ML
M TB IFN-G CD4+CD8+ BCKGRND COR BLD-ACNC: 0.04 IU/ML
MITOGEN IGNF BCKGRD COR BLD-ACNC: >10 IU/ML
QUANTIFERON INCUBATION: NORMAL
SERVICE CMNT-IMP: NORMAL

## 2024-10-26 DIAGNOSIS — L73.2 HIDRADENITIS SUPPURATIVA: ICD-10-CM

## 2024-10-28 DIAGNOSIS — L73.2 HIDRADENITIS SUPPURATIVA: ICD-10-CM

## 2024-10-28 RX ORDER — SPIRONOLACTONE 50 MG/1
50 TABLET, FILM COATED ORAL DAILY
Qty: 90 TABLET | Refills: 0 | Status: SHIPPED | OUTPATIENT
Start: 2024-10-28

## 2024-10-29 RX ORDER — DOXYCYCLINE 100 MG/1
CAPSULE ORAL
Qty: 50 CAPSULE | Refills: 1 | OUTPATIENT
Start: 2024-10-29

## 2024-11-05 DIAGNOSIS — L73.2 HIDRADENITIS SUPPURATIVA: ICD-10-CM

## 2024-11-06 RX ORDER — IBUPROFEN 800 MG/1
800 TABLET, FILM COATED ORAL EVERY 8 HOURS PRN
Qty: 30 TABLET | Refills: 1 | OUTPATIENT
Start: 2024-11-06

## 2024-11-13 DIAGNOSIS — L73.2 HIDRADENITIS SUPPURATIVA: ICD-10-CM

## 2024-11-13 RX ORDER — IBUPROFEN 800 MG/1
800 TABLET, FILM COATED ORAL EVERY 8 HOURS PRN
Qty: 30 TABLET | Refills: 1 | Status: SHIPPED | OUTPATIENT
Start: 2024-11-13

## 2024-11-25 ENCOUNTER — OFFICE VISIT (OUTPATIENT)
Dept: SLEEP MEDICINE | Facility: HOSPITAL | Age: 44
End: 2024-11-25
Payer: COMMERCIAL

## 2024-11-25 VITALS
SYSTOLIC BLOOD PRESSURE: 117 MMHG | OXYGEN SATURATION: 97 % | HEART RATE: 86 BPM | BODY MASS INDEX: 37.05 KG/M2 | DIASTOLIC BLOOD PRESSURE: 84 MMHG | WEIGHT: 217 LBS | HEIGHT: 64 IN

## 2024-11-25 DIAGNOSIS — G47.419 NARCOLEPSY WITHOUT CATAPLEXY: ICD-10-CM

## 2024-11-25 PROCEDURE — 99214 OFFICE O/P EST MOD 30 MIN: CPT | Performed by: INTERNAL MEDICINE

## 2024-11-25 PROCEDURE — G0463 HOSPITAL OUTPT CLINIC VISIT: HCPCS

## 2024-11-25 RX ORDER — CLINDAMYCIN PHOSPHATE 10 UG/ML
LOTION TOPICAL AS NEEDED
COMMUNITY
Start: 2024-10-28

## 2024-11-25 RX ORDER — DEXTROAMPHETAMINE SACCHARATE, AMPHETAMINE ASPARTATE, DEXTROAMPHETAMINE SULFATE AND AMPHETAMINE SULFATE 5; 5; 5; 5 MG/1; MG/1; MG/1; MG/1
20 TABLET ORAL 2 TIMES DAILY
Qty: 60 TABLET | Refills: 0 | Status: SHIPPED | OUTPATIENT
Start: 2024-11-25

## 2024-11-25 NOTE — PROGRESS NOTES
"  [facility]  Medicine  [address] [place] [id number]  Phone: [phone]  Fax: [phone]      SLEEP CLINIC FOLLOW UP PROGRESS NOTE.    Sandra Dhillon  1980  44 y.o.  female      PCP: Veronica Liang APRN      Date of visit: 11/25/2024    No chief complaint on file.  Narcolepsy without cataplexy    HPI:  This is a 44 y.o. years old patient is here for the management of narcolepsy without cataplexy.  She underwent a polysomnography on February 9, 2023 followed by MSLT which showed shortened sleep latency of 4 minutes and 12 seconds with 3 REM sleep onset out of 5 naps.      Initially she was put on modafinil did not work well for her.  Also tried to Wakix but gave her a terrible headache which has to be stopped  At present she is on Adderall 20 mg twice a day and fairly doing well on the sleepiness but she complains of fatigue.  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    Bedtime midnight  Wake time 4 AM, she has to take her son to school  Number of times awakenings at night 1-2      Mediactions and allergies are reviewed by me and documented in the encounter.       SOCIAL ( habits pertaining to sleep medicine)  History of smoking:Yes   History of alcohol use: 2 per week  Caffeine use: 2     REVIEW OF SYSTEMS:   [place] Sleepiness Scale :Total score: 20   Morining headache:No   Anxiety:No   Depression:Yes    PHYSICAL EXAMINATION:  CONSTITUTIONAL:  Vitals:    11/25/24 1300   BP: 117/84   Pulse: 86   SpO2: 97%   Weight: 98.4 kg (217 lb)   Height: 161.3 cm (63.5\")    Body mass index is 37.83 kg/m².   NOSE: nasal passages are clear, no nasal polyps, septum in the midline.  THROAT: throat is clear,   RESP SYSTEM: Breath sounds are normal, no wheezes or crackles  CARDIOVASULAR: Heart rate is regular without murmur. No edema    Sergei checked on PDMP, 11/25/2024      ASSESSMENT AND PLAN:  Narcolepsy without cataplexy G 47.419.  At present " she is on Adderall 20 mg twice a day and doing well.  Sergei checked no problems and the prescription has been renewed.  Obesity, 1 with BMI is Body mass index is 37.83 kg/m².. I have discuss the relationship between the weight and sleep apnea. The benefit of weight loss in reducing severity of sleep apnea was discussed. Discussed diet and exercise with the patient to archive ideal BMI.  Return in about 6 months (around 5/25/2025) for Next scheduled follow-up. . Patient's questions were answered.        Akosua Martínez MD  Sleep Medicine.  Medical Director, [facility] sleep centers  11/25/2024 ,

## 2024-12-07 DIAGNOSIS — L73.2 HIDRADENITIS SUPPURATIVA: ICD-10-CM

## 2024-12-09 RX ORDER — DOXYCYCLINE 100 MG/1
CAPSULE ORAL
Qty: 50 CAPSULE | Refills: 1 | OUTPATIENT
Start: 2024-12-09

## 2025-01-16 DIAGNOSIS — F41.9 ANXIETY DISORDER, UNSPECIFIED: ICD-10-CM

## 2025-01-16 RX ORDER — ESCITALOPRAM OXALATE 20 MG/1
20 TABLET ORAL DAILY
Qty: 90 TABLET | Refills: 1 | OUTPATIENT
Start: 2025-01-16

## 2025-01-27 ENCOUNTER — OFFICE VISIT (OUTPATIENT)
Dept: FAMILY MEDICINE CLINIC | Facility: CLINIC | Age: 45
End: 2025-01-27
Payer: COMMERCIAL

## 2025-01-27 ENCOUNTER — LAB (OUTPATIENT)
Dept: LAB | Facility: HOSPITAL | Age: 45
End: 2025-01-27
Payer: COMMERCIAL

## 2025-01-27 VITALS
BODY MASS INDEX: 37.05 KG/M2 | WEIGHT: 217 LBS | DIASTOLIC BLOOD PRESSURE: 82 MMHG | HEART RATE: 99 BPM | SYSTOLIC BLOOD PRESSURE: 126 MMHG | OXYGEN SATURATION: 100 % | HEIGHT: 64 IN

## 2025-01-27 DIAGNOSIS — R74.8 ELEVATED ALKALINE PHOSPHATASE LEVEL: ICD-10-CM

## 2025-01-27 DIAGNOSIS — D64.9 ANEMIA, UNSPECIFIED TYPE: ICD-10-CM

## 2025-01-27 DIAGNOSIS — D50.9 IRON DEFICIENCY ANEMIA, UNSPECIFIED IRON DEFICIENCY ANEMIA TYPE: ICD-10-CM

## 2025-01-27 DIAGNOSIS — G47.419 NARCOLEPSY WITHOUT CATAPLEXY: ICD-10-CM

## 2025-01-27 DIAGNOSIS — L73.2 HIDRADENITIS SUPPURATIVA: ICD-10-CM

## 2025-01-27 DIAGNOSIS — Z12.11 COLON CANCER SCREENING: Primary | ICD-10-CM

## 2025-01-27 DIAGNOSIS — G43.809 OTHER MIGRAINE WITHOUT STATUS MIGRAINOSUS, NOT INTRACTABLE: ICD-10-CM

## 2025-01-27 LAB
FERRITIN SERPL-MCNC: 117 NG/ML (ref 13–150)
FOLATE SERPL-MCNC: 6.4 NG/ML (ref 4.78–24.2)
GGT SERPL-CCNC: 11 U/L (ref 5–36)
IRON 24H UR-MRATE: 41 MCG/DL (ref 37–145)
IRON SATN MFR SERPL: 14 % (ref 20–50)
TIBC SERPL-MCNC: 292 MCG/DL (ref 298–536)
TRANSFERRIN SERPL-MCNC: 196 MG/DL (ref 200–360)
VIT B12 BLD-MCNC: 523 PG/ML (ref 211–946)

## 2025-01-27 PROCEDURE — 82746 ASSAY OF FOLIC ACID SERUM: CPT

## 2025-01-27 PROCEDURE — 82977 ASSAY OF GGT: CPT

## 2025-01-27 PROCEDURE — 84466 ASSAY OF TRANSFERRIN: CPT

## 2025-01-27 PROCEDURE — 99214 OFFICE O/P EST MOD 30 MIN: CPT | Performed by: NURSE PRACTITIONER

## 2025-01-27 PROCEDURE — 83540 ASSAY OF IRON: CPT

## 2025-01-27 PROCEDURE — 84075 ASSAY ALKALINE PHOSPHATASE: CPT

## 2025-01-27 PROCEDURE — 82607 VITAMIN B-12: CPT

## 2025-01-27 PROCEDURE — 82728 ASSAY OF FERRITIN: CPT

## 2025-01-27 PROCEDURE — 36415 COLL VENOUS BLD VENIPUNCTURE: CPT

## 2025-01-27 PROCEDURE — 84080 ASSAY ALKALINE PHOSPHATASES: CPT

## 2025-01-27 RX ORDER — IBUPROFEN 800 MG/1
800 TABLET, FILM COATED ORAL EVERY 8 HOURS PRN
Qty: 30 TABLET | Refills: 1 | Status: SHIPPED | OUTPATIENT
Start: 2025-01-27

## 2025-01-27 RX ORDER — DEXTROAMPHETAMINE SACCHARATE, AMPHETAMINE ASPARTATE, DEXTROAMPHETAMINE SULFATE AND AMPHETAMINE SULFATE 5; 5; 5; 5 MG/1; MG/1; MG/1; MG/1
20 TABLET ORAL 2 TIMES DAILY
Qty: 60 TABLET | Refills: 0 | OUTPATIENT
Start: 2025-01-27

## 2025-01-27 RX ORDER — SPIRONOLACTONE 50 MG/1
50 TABLET, FILM COATED ORAL DAILY
Qty: 90 TABLET | Refills: 1 | Status: SHIPPED | OUTPATIENT
Start: 2025-01-27

## 2025-01-27 RX ORDER — TOPIRAMATE 50 MG/1
50 TABLET, FILM COATED ORAL 2 TIMES DAILY
Qty: 180 TABLET | Refills: 1 | Status: SHIPPED | OUTPATIENT
Start: 2025-01-27

## 2025-01-27 NOTE — ASSESSMENT & PLAN NOTE
Stable at present, patient has finally saw dermatology and got started on Cosentyx, she will continue to follow-up with them for that medication, we will follow-up here in 6 months

## 2025-01-27 NOTE — ASSESSMENT & PLAN NOTE
Patient reports she quit taking the ferrous sulfate, has not had labs in some time but did have them drawn this morning, awaiting results, if deficiency persists we will reinitiate ferrous sulfate

## 2025-01-27 NOTE — PROGRESS NOTES
Chief Complaint  Hidradenitis Suppurativa, JATIN, migraines    SUBJECTIVE  Sandra Dhillon presents to [facility] for six month follow up on Hidradenitis Suppurativa. Pt finally saw dermatology- just started Cosentix 2 weeks ago, continuing the metformin and spironolactone, doing well.  Patient states she quit taking the ferrous sulfate  Cologuard order had , placing new order.     History of Present Illness  Past Medical History:   Diagnosis Date    Anemia     Anxiety     Arthritis     Asthma     Depression     Foot pain, right     Forgetfulness     Heel pain     Hemorrhoids     Hidradenitis suppurativa     Ingrown toenail     Migraines     Numbness in feet       Family History   Problem Relation Age of Onset    Cancer Mother         Unspecified    Arthritis Mother     Diabetes Mother     Cancer Maternal Grandfather         Thyroid pancreatic    Diabetes Other     Diabetes Paternal Grandmother     Cancer Paternal Grandfather       Past Surgical History:   Procedure Laterality Date    D & C CERVICAL BIOPSY      OTHER SURGICAL HISTORY      Arm surgery    OTHER SURGICAL HISTORY      Excision, sweat glands, inguinal region    TUBAL ABDOMINAL LIGATION          Current Outpatient Medications:     amphetamine-dextroamphetamine (ADDERALL) 20 MG tablet, Take 1 tablet by mouth 2 (Two) Times a Day., Disp: 60 tablet, Rfl: 0    clindamycin (CLEOCIN T) 1 % lotion, As Needed., Disp: , Rfl:     escitalopram (LEXAPRO) 20 MG tablet, 1 tablet., Disp: , Rfl:     ibuprofen (ADVIL,MOTRIN) 800 MG tablet, Take 1 tablet by mouth Every 8 (Eight) Hours As Needed for Moderate Pain., Disp: 30 tablet, Rfl: 1    metFORMIN (GLUCOPHAGE) 500 MG tablet, Take 1 tablet by mouth 2 (Two) Times a Day With Meals., Disp: 180 tablet, Rfl: 0    spironolactone (ALDACTONE) 50 MG tablet, Take 1 tablet by mouth Daily., Disp: 90 tablet, Rfl: 1    topiramate (Topamax) 50 MG tablet, Take 1 tablet by mouth 2 (Two) Times a Day.,  "Disp: 180 tablet, Rfl: 1    ferrous sulfate (FeroSul) 325 (65 FE) MG tablet, Take 1 tablet by mouth Daily With Breakfast. (Patient not taking: Reported on 1/27/2025), Disp: 90 tablet, Rfl: 1    OBJECTIVE  Vital Signs:   /82   Pulse 99   Ht 161.3 cm (63.5\")   Wt 98.4 kg (217 lb)   SpO2 100%   BMI 37.84 kg/m²    Estimated body mass index is 37.84 kg/m² as calculated from the following:    Height as of this encounter: 161.3 cm (63.5\").    Weight as of this encounter: 98.4 kg (217 lb).     Wt Readings from Last 3 Encounters:   01/27/25 98.4 kg (217 lb)   11/25/24 98.4 kg (217 lb)   08/30/24 98.8 kg (217 lb 12.8 oz)     BP Readings from Last 3 Encounters:   01/27/25 126/82   11/25/24 117/84   08/30/24 102/71       Physical Exam  Vitals reviewed.   Constitutional:       Appearance: Normal appearance. She is well-developed.   HENT:      Head: Normocephalic and atraumatic.      Right Ear: External ear normal.      Left Ear: External ear normal.   Eyes:      Conjunctiva/sclera: Conjunctivae normal.      Pupils: Pupils are equal, round, and reactive to light.   Cardiovascular:      Rate and Rhythm: Normal rate and regular rhythm.      Heart sounds: No murmur heard.     No friction rub. No gallop.   Pulmonary:      Effort: Pulmonary effort is normal.      Breath sounds: Normal breath sounds. No wheezing or rhonchi.   Skin:     General: Skin is warm and dry.   Neurological:      Mental Status: She is alert and oriented to person, place, and time.      Cranial Nerves: No cranial nerve deficit.   Psychiatric:         Mood and Affect: Mood and affect normal.         Behavior: Behavior normal.         Thought Content: Thought content normal.         Judgment: Judgment normal.          Result Review    [facility]          10/14/2024    09:28   CMP   Glucose 76    BUN 12    Creatinine 0.67    EGFR 110.0    Sodium 137    Potassium 3.9    Chloride 97    Calcium 8.8    Total Protein 7.3    Albumin 3.6    Globulin 3.7    Total " Bilirubin 0.2    Alkaline Phosphatase 147    AST (SGOT) 12    ALT (SGPT) 9    Albumin/Globulin Ratio 1.0    BUN/Creatinine Ratio 17.9    Anion Gap 14.9      CBC          10/14/2024    09:28   CBC   WBC 10.20    RBC 3.69    Hemoglobin 10.3    Hematocrit 32.5    MCV 88.1    MCH 27.9    MCHC 31.7    RDW 13.1    Platelets 377      Lipid Panel          10/14/2024    09:28   Lipid Panel   Total Cholesterol 153    Triglycerides 70    HDL Cholesterol 43    VLDL Cholesterol 14    LDL Cholesterol  96    LDL/HDL Ratio 2.23      TSH          10/14/2024    09:28   TSH   TSH 1.820            XR ANKLE LEFT 3 VIEWS Non-Weight Bearing    Result Date: 1/12/2025  No acute fracture. Images reviewed, interpreted, and dictated by Dr. Hernando Hanks. Transcribed by Sana WOO (R), MICHELLE (R).       The above data has been reviewed by MEME Child 01/27/2025 10:46 EST.          Patient Care Team:  Veronica Liang APRN as PCP - General (Nurse Practitioner)  Kala Jacome MD as Consulting Physician (General Surgery)            ASSESSMENT & PLAN    Diagnoses and all orders for this visit:    1. Colon cancer screening (Primary)  -     Cologuard - Stool, Per Rectum; Future    2. Hidradenitis suppurativa  Assessment & Plan:  Stable at present, patient has finally saw dermatology and got started on Cosentyx, she will continue to follow-up with them for that medication, we will follow-up here in 6 months    Orders:  -     metFORMIN (GLUCOPHAGE) 500 MG tablet; Take 1 tablet by mouth 2 (Two) Times a Day With Meals.  Dispense: 180 tablet; Refill: 0  -     ibuprofen (ADVIL,MOTRIN) 800 MG tablet; Take 1 tablet by mouth Every 8 (Eight) Hours As Needed for Moderate Pain.  Dispense: 30 tablet; Refill: 1  -     spironolactone (ALDACTONE) 50 MG tablet; Take 1 tablet by mouth Daily.  Dispense: 90 tablet; Refill: 1    3. Other migraine without status migrainosus, not intractable  Overview:  Stable and well-controlled  Topamax, continue current dose    Orders:  -     topiramate (Topamax) 50 MG tablet; Take 1 tablet by mouth 2 (Two) Times a Day.  Dispense: 180 tablet; Refill: 1    4. Iron deficiency anemia, unspecified iron deficiency anemia type  Assessment & Plan:  Patient reports she quit taking the ferrous sulfate, has not had labs in some time but did have them drawn this morning, awaiting results, if deficiency persists we will reinitiate ferrous sulfate           Tobacco Use: High Risk (1/27/2025)    Patient History     Smoking Tobacco Use: Every Day     Smokeless Tobacco Use: Never     Passive Exposure: Current       Follow Up     Return in about 6 months (around 7/27/2025), or if symptoms worsen or fail to improve.        Patient was given instructions and counseling regarding her condition or for health maintenance advice. Please see specific information pulled into the AVS if appropriate.   I have reviewed information obtained and documented by others and I have confirmed the accuracy of this documented note.    MEME Child

## 2025-01-28 ENCOUNTER — TELEPHONE (OUTPATIENT)
Dept: FAMILY MEDICINE CLINIC | Facility: CLINIC | Age: 45
End: 2025-01-28
Payer: COMMERCIAL

## 2025-01-28 DIAGNOSIS — G47.419 NARCOLEPSY WITHOUT CATAPLEXY: ICD-10-CM

## 2025-01-28 RX ORDER — DEXTROAMPHETAMINE SACCHARATE, AMPHETAMINE ASPARTATE, DEXTROAMPHETAMINE SULFATE AND AMPHETAMINE SULFATE 5; 5; 5; 5 MG/1; MG/1; MG/1; MG/1
20 TABLET ORAL 2 TIMES DAILY
Qty: 60 TABLET | Refills: 0 | Status: SHIPPED | OUTPATIENT
Start: 2025-01-28

## 2025-01-29 LAB
ALP BONE CFR SERPL: 24 % (ref 14–68)
ALP INTEST CFR SERPL: 8 % (ref 0–18)
ALP LIVER CFR SERPL: 68 % (ref 18–85)
ALP SERPL-CCNC: 139 IU/L (ref 44–121)

## 2025-03-24 ENCOUNTER — OFFICE VISIT (OUTPATIENT)
Dept: FAMILY MEDICINE CLINIC | Facility: CLINIC | Age: 45
End: 2025-03-24
Payer: COMMERCIAL

## 2025-03-24 VITALS
WEIGHT: 214.8 LBS | BODY MASS INDEX: 36.67 KG/M2 | DIASTOLIC BLOOD PRESSURE: 85 MMHG | SYSTOLIC BLOOD PRESSURE: 132 MMHG | HEIGHT: 64 IN | OXYGEN SATURATION: 99 % | HEART RATE: 80 BPM | TEMPERATURE: 98.1 F

## 2025-03-24 DIAGNOSIS — J04.0 LARYNGITIS: ICD-10-CM

## 2025-03-24 DIAGNOSIS — R05.1 ACUTE COUGH: ICD-10-CM

## 2025-03-24 DIAGNOSIS — J02.9 SORE THROAT: Primary | ICD-10-CM

## 2025-03-24 DIAGNOSIS — R09.81 NASAL CONGESTION: ICD-10-CM

## 2025-03-24 LAB
EXPIRATION DATE: NORMAL
EXPIRATION DATE: NORMAL
FLUAV AG UPPER RESP QL IA.RAPID: NOT DETECTED
FLUBV AG UPPER RESP QL IA.RAPID: NOT DETECTED
INTERNAL CONTROL: NORMAL
INTERNAL CONTROL: NORMAL
Lab: NORMAL
Lab: NORMAL
S PYO AG THROAT QL: NEGATIVE
SARS-COV-2 AG UPPER RESP QL IA.RAPID: NOT DETECTED

## 2025-03-24 PROCEDURE — 87428 SARSCOV & INF VIR A&B AG IA: CPT | Performed by: STUDENT IN AN ORGANIZED HEALTH CARE EDUCATION/TRAINING PROGRAM

## 2025-03-24 PROCEDURE — 87880 STREP A ASSAY W/OPTIC: CPT | Performed by: STUDENT IN AN ORGANIZED HEALTH CARE EDUCATION/TRAINING PROGRAM

## 2025-03-24 PROCEDURE — 99213 OFFICE O/P EST LOW 20 MIN: CPT | Performed by: STUDENT IN AN ORGANIZED HEALTH CARE EDUCATION/TRAINING PROGRAM

## 2025-03-24 RX ORDER — GUAIFENESIN AND DEXTROMETHORPHAN HYDROBROMIDE 600; 30 MG/1; MG/1
1 TABLET, EXTENDED RELEASE ORAL 2 TIMES DAILY PRN
Qty: 8 TABLET | Refills: 0 | Status: SHIPPED | OUTPATIENT
Start: 2025-03-24

## 2025-03-24 RX ORDER — SECUKINUMAB 300 MG/2ML
INJECTION SUBCUTANEOUS
COMMUNITY
Start: 2025-03-15

## 2025-03-24 NOTE — PROGRESS NOTES
Subjective:       Sandra Dhillon is a 44 y.o. female who presents for sore throat.     Patient is normally seen by her PCP, my colleague Veronica Liang. I am seeing her today for acute sick visit.     Sandra reports sore throat and hoarseness. Associated symptoms include nasal congestion. No trismus is present. She denies drooling or struggle to breath.  (See ROS for more thorough list of pertinent positives and negatives.)    Symptoms started Saturday (three days ago) and have not improved.  She tells me she is up-to-date on vaccinations including the Hib vaccine.    She has been taking Dayquil and OTC cough drops with limited relief.     Vital signs are reassuring.  She is afebrile with a temperature of 98.1 °F.  She is normotensive with a blood pressure 132/85.  She is not tachycardic as her heart rate is 80.  She is satting 99% on room air.    On physical exam, she is not toxic appearing or in any acute distress.  She is sitting comfortably and not leaning forward or struggling to breathe.  Respiratory rate is normal.  I do not appreciate any cervical lymphadenopathy or obvious neck masses on palpation.  Her pharynx is erythematous but not edematous.    POC Covid/flu and strep testing obtained and were negative.     Differential diagnosis would include a viral upper respiratory tract infection with pharyngitis and laryngitis.  Some patients with pain with swallowing and laryngitis can have a tonsillar abscess but given that she is afebrile and no neck masses were identified on exam and airway is patent with no trouble with respirations, I have a low suspicion for this.  I did offer the option for an x-ray of the neck, which she defers today in favor of symptomatic treatment.  Will send in some as needed medicines to help with her current symptoms.  We did discuss that if symptoms worsen she should let us know so that we can pursue other imaging or testing as needed.  And of course, where she to develop stridor  or difficulty with respirations that would be a reason to present to the hospital for emergent evaluation.      Past Medical Hx:  Past Medical History:   Diagnosis Date    Anemia     Anxiety     Arthritis     Asthma     Depression     Foot pain, right     Forgetfulness     Heel pain     Hemorrhoids     Hidradenitis suppurativa     Ingrown toenail     Migraines     Numbness in feet        Past Surgical Hx:  Past Surgical History:   Procedure Laterality Date    D & C CERVICAL BIOPSY      OTHER SURGICAL HISTORY      Arm surgery    OTHER SURGICAL HISTORY      Excision, sweat glands, inguinal region    TUBAL ABDOMINAL LIGATION         Current Meds:    Current Outpatient Medications:     amphetamine-dextroamphetamine (ADDERALL) 20 MG tablet, Take 1 tablet by mouth 2 (Two) Times a Day., Disp: 60 tablet, Rfl: 0    clindamycin (CLEOCIN T) 1 % lotion, As Needed., Disp: , Rfl:     Cosentyx UnoReady 300 MG/2ML solution auto-injector, , Disp: , Rfl:     escitalopram (LEXAPRO) 20 MG tablet, 1 tablet., Disp: , Rfl:     ibuprofen (ADVIL,MOTRIN) 800 MG tablet, Take 1 tablet by mouth Every 8 (Eight) Hours As Needed for Moderate Pain., Disp: 30 tablet, Rfl: 1    metFORMIN (GLUCOPHAGE) 500 MG tablet, Take 1 tablet by mouth 2 (Two) Times a Day With Meals., Disp: 180 tablet, Rfl: 0    spironolactone (ALDACTONE) 50 MG tablet, Take 1 tablet by mouth Daily., Disp: 90 tablet, Rfl: 1    topiramate (Topamax) 50 MG tablet, Take 1 tablet by mouth 2 (Two) Times a Day., Disp: 180 tablet, Rfl: 1    ferrous sulfate (FeroSul) 325 (65 FE) MG tablet, Take 1 tablet by mouth Daily With Breakfast. (Patient not taking: Reported on 11/25/2024), Disp: 90 tablet, Rfl: 1    guaifenesin-dextromethorphan 600-30 mg (MUCINEX DM)  MG tablet sustained-release 12 hour, Take 1 tablet by mouth 2 (Two) Times a Day As Needed (cough, congestion)., Disp: 8 tablet, Rfl: 0    phenol (CHLORASEPTIC) 1.4 % liquid liquid, Apply 1 spray to the mouth or throat Every 2 (Two)  "Hours As Needed (sore throat)., Disp: 118 mL, Rfl: 0    Allergies:  No Known Allergies    Family Hx:  Family History   Problem Relation Age of Onset    Cancer Mother         Unspecified    Arthritis Mother     Diabetes Mother     Cancer Maternal Grandfather         Thyroid pancreatic    Diabetes Other     Diabetes Paternal Grandmother     Cancer Paternal Grandfather         Social History:  Social History     Socioeconomic History    Marital status:    Tobacco Use    Smoking status: Every Day     Current packs/day: 0.75     Average packs/day: 0.8 packs/day for 15.0 years (11.3 ttl pk-yrs)     Types: Cigarettes     Passive exposure: Current    Smokeless tobacco: Never    Tobacco comments:     Want to quit   Vaping Use    Vaping status: Some Days    Substances: Nicotine    Devices: Disposable   Substance and Sexual Activity    Alcohol use: Not Currently     Comment: occasionally    Drug use: Not Currently    Sexual activity: Yes     Partners: Male     Birth control/protection: Vasectomy       Review of Systems  Review of Systems   Constitutional:  Positive for chills. Negative for activity change, appetite change, fatigue and fever.   HENT:  Positive for congestion, sinus pressure, sinus pain, sore throat, tinnitus, trouble swallowing and voice change. Negative for ear pain.    Respiratory:  Positive for cough (nonproductive) and shortness of breath (when trying to sleep - not hard to breathe but feels \"different\"). Negative for choking, chest tightness, wheezing and stridor.    Gastrointestinal:  Negative for nausea and vomiting.   Musculoskeletal:  Negative for myalgias.   Neurological:  Negative for dizziness, weakness and light-headedness.       Objective:     /85 (BP Location: Right arm, Patient Position: Sitting, Cuff Size: Large Adult)   Pulse 80   Temp 98.1 °F (36.7 °C) (Oral)   Ht 161.3 cm (63.5\")   Wt 97.4 kg (214 lb 12.8 oz)   SpO2 99%   BMI 37.45 kg/m²   Physical Exam  Constitutional:  "      General: She is not in acute distress.     Appearance: Normal appearance. She is not ill-appearing, toxic-appearing or diaphoretic.   Pulmonary:      Effort: Pulmonary effort is normal. No respiratory distress.   Neurological:      Mental Status: She is alert.          Assessment/Plan:     Diagnoses and all orders for this visit:    1. Sore throat (Primary)  4. Laryngitis    Sandra reports sore throat and hoarseness. Associated symptoms include nasal congestion. No trismus is present. She denies drooling or struggle to breath.  (See ROS for more thorough list of pertinent positives and negatives.)    Symptoms started Saturday (three days ago) and have not improved.  She tells me she is up-to-date on vaccinations including the Hib vaccine.    She has been taking Dayquil and OTC cough drops with limited relief.     Vital signs are reassuring.  She is afebrile with a temperature of 98.1 °F.  She is normotensive with a blood pressure 132/85.  She is not tachycardic as her heart rate is 80.  She is satting 99% on room air.    On physical exam, she is not toxic appearing or in any acute distress.  She is sitting comfortably and not leaning forward or struggling to breathe.  Respiratory rate is normal.  I do not appreciate any cervical lymphadenopathy or obvious neck masses on palpation.  Her pharynx is erythematous but not edematous.  I do not note deviation of the uvula or tonsillar displacement.    POC Covid/flu and strep testing obtained and were negative.     Differential diagnosis would include a viral upper respiratory tract infection with pharyngitis and laryngitis.  Some patients with pain with swallowing and laryngitis can have a tonsillar abscess but given that she is afebrile and no neck masses were identified on exam and airway is patent with no trouble with respirations, I have a low suspicion for this.  I did offer the option for an x-ray of the neck, which she defers today in favor of symptomatic  treatment.  Will send in some as needed medicines to help with her current symptoms.  We did discuss that if symptoms worsen she should let us know so that we can pursue other imaging or testing as needed.  And of course, where she to develop stridor or difficulty with respirations that would be a reason to present to the hospital for emergent evaluation.    -     POCT rapid strep A  -     POCT SARS-CoV-2 Antigen MALENA + Flu  -     phenol (CHLORASEPTIC) 1.4 % liquid liquid; Apply 1 spray to the mouth or throat Every 2 (Two) Hours As Needed (sore throat).  Dispense: 118 mL; Refill: 0    2. Nasal congestion  -     guaifenesin-dextromethorphan 600-30 mg (MUCINEX DM)  MG tablet sustained-release 12 hour; Take 1 tablet by mouth 2 (Two) Times a Day As Needed (cough, congestion).  Dispense: 8 tablet; Refill: 0    3. Acute cough  -     guaifenesin-dextromethorphan 600-30 mg (MUCINEX DM)  MG tablet sustained-release 12 hour; Take 1 tablet by mouth 2 (Two) Times a Day As Needed (cough, congestion).  Dispense: 8 tablet; Refill: 0                    Follow-up:     Return if symptoms worsen or fail to improve.    Preventative:  Health Maintenance   Topic Date Due    Pneumococcal Vaccine 0-49 (1 of 2 - PCV) Never done    COLORECTAL CANCER SCREENING  Never done    PAP SMEAR  12/22/2023    COVID-19 Vaccine (4 - 2024-25 season) 09/01/2024    INFLUENZA VACCINE  03/31/2025 (Originally 7/1/2024)    ANNUAL PHYSICAL  07/25/2025    BMI FOLLOWUP  07/25/2025    MAMMOGRAM  09/03/2026    TDAP/TD VACCINES (2 - Td or Tdap) 09/07/2033    HEPATITIS C SCREENING  Completed         This document has been electronically signed by Bobby Everett MD on March 24, 2025 14:00 EDT       Parts of this note are electronic transcriptions/translations of spoken language to printed text using the Dragon Dictation system.

## 2025-05-13 ENCOUNTER — TELEPHONE (OUTPATIENT)
Dept: SLEEP MEDICINE | Facility: HOSPITAL | Age: 45
End: 2025-05-13
Payer: COMMERCIAL

## 2025-05-13 DIAGNOSIS — L73.2 HIDRADENITIS SUPPURATIVA: ICD-10-CM

## 2025-05-13 RX ORDER — IBUPROFEN 800 MG/1
800 TABLET, FILM COATED ORAL EVERY 8 HOURS PRN
Qty: 30 TABLET | Refills: 1 | Status: SHIPPED | OUTPATIENT
Start: 2025-05-13

## 2025-05-14 DIAGNOSIS — G47.419 NARCOLEPSY WITHOUT CATAPLEXY: ICD-10-CM

## 2025-05-14 RX ORDER — DEXTROAMPHETAMINE SACCHARATE, AMPHETAMINE ASPARTATE, DEXTROAMPHETAMINE SULFATE AND AMPHETAMINE SULFATE 5; 5; 5; 5 MG/1; MG/1; MG/1; MG/1
20 TABLET ORAL 2 TIMES DAILY
Qty: 60 TABLET | Refills: 0 | Status: SHIPPED | OUTPATIENT
Start: 2025-05-14

## 2025-06-17 ENCOUNTER — TELEPHONE (OUTPATIENT)
Dept: SLEEP MEDICINE | Facility: HOSPITAL | Age: 45
End: 2025-06-17
Payer: COMMERCIAL

## 2025-06-17 DIAGNOSIS — G47.419 NARCOLEPSY WITHOUT CATAPLEXY: ICD-10-CM

## 2025-06-17 RX ORDER — DEXTROAMPHETAMINE SACCHARATE, AMPHETAMINE ASPARTATE, DEXTROAMPHETAMINE SULFATE AND AMPHETAMINE SULFATE 5; 5; 5; 5 MG/1; MG/1; MG/1; MG/1
20 TABLET ORAL 2 TIMES DAILY
Qty: 60 TABLET | Refills: 0 | Status: SHIPPED | OUTPATIENT
Start: 2025-06-17

## 2025-07-02 ENCOUNTER — OFFICE VISIT (OUTPATIENT)
Dept: SLEEP MEDICINE | Facility: HOSPITAL | Age: 45
End: 2025-07-02
Payer: COMMERCIAL

## 2025-07-02 VITALS
HEART RATE: 76 BPM | HEIGHT: 64 IN | OXYGEN SATURATION: 99 % | DIASTOLIC BLOOD PRESSURE: 85 MMHG | SYSTOLIC BLOOD PRESSURE: 122 MMHG | BODY MASS INDEX: 34.25 KG/M2 | WEIGHT: 200.6 LBS

## 2025-07-02 DIAGNOSIS — G47.419 NARCOLEPSY WITHOUT CATAPLEXY: Primary | ICD-10-CM

## 2025-07-02 PROCEDURE — 99214 OFFICE O/P EST MOD 30 MIN: CPT | Performed by: INTERNAL MEDICINE

## 2025-07-02 PROCEDURE — G0463 HOSPITAL OUTPT CLINIC VISIT: HCPCS

## 2025-07-02 NOTE — PROGRESS NOTES
"  [facility]  Sleep medicine  [address] [place] [id number]  Phone: [phone]  Fax: [phone]      SLEEP CLINIC FOLLOW UP PROGRESS NOTE.    Sandra Guevarasuresh  1980  44 y.o.  female      PCP: Veronica Liang APRN      Date of visit: 7/2/2025    Chief Complaint   Patient presents with    Daytime Sleepiness   Narcolepsy without cataplexy    HPI:  This is a 44 y.o. years old patient is here for the management of narcolepsy without cataplexy.  She underwent a polysomnography on February 9, 2023 followed by [facility] which showed shortened sleep latency of 4 minutes and 12 seconds with 3 REM sleep onset out of 5 naps.      Initially she was put on modafinil did not work well for her.  Also tried to Wakix but gave her a terrible headache which has to be stopped  At present she is on Adderall 20 mg twice a day and fairly doing well on the sleepiness but she complains of fatigue.  Complains of sleep drunkenness    Bedtime midnight  Wake time 4 AM, she has to take her son to school  Number of times awakenings at night 1-2      Mediactions and allergies are reviewed by me and documented in the encounter.       SOCIAL ( habits pertaining to sleep medicine)  History of smoking:Yes   History of alcohol use: 2 per week  Caffeine use: 2     REVIEW OF SYSTEMS:   [place] Sleepiness Scale :Total score: 19   Morining headache:No   Anxiety:No   Depression:Yes    PHYSICAL EXAMINATION:  CONSTITUTIONAL:  Vitals:    07/02/25 1100   BP: 122/85   Pulse: 76   SpO2: 99%   Weight: 91 kg (200 lb 9.6 oz)   Height: 161.3 cm (63.5\")    Body mass index is 34.97 kg/m².   NOSE: nasal passages are clear, no nasal polyps, septum in the midline.  THROAT: throat is clear,   RESP SYSTEM: Breath sounds are normal, no wheezes or crackles  CARDIOVASULAR: Heart rate is regular without murmur. No edema    Sergei checked on PDMP, 7/2/2025      ASSESSMENT AND PLAN:  Narcolepsy without cataplexy G 47.419.  At present " she is on Adderall 20 mg twice a day and doing well.  Sergei checked no problems and the prescription has been renewed.  Obesity, 1 with BMI is Body mass index is 34.97 kg/m².. I have discuss the relationship between the weight and sleep apnea. The benefit of weight loss in reducing severity of sleep apnea was discussed. Discussed diet and exercise with the patient to archive ideal BMI.  Return in about 6 months (around 1/2/2026) for Next scheduled follow-up. . Patient's questions were answered.        Akosua Martínez MD  Sleep Medicine.  Medical Director, [facility] sleep [facility]  7/2/2025 ,

## 2025-07-25 ENCOUNTER — TELEPHONE (OUTPATIENT)
Dept: SLEEP MEDICINE | Facility: HOSPITAL | Age: 45
End: 2025-07-25
Payer: COMMERCIAL

## 2025-07-28 ENCOUNTER — OFFICE VISIT (OUTPATIENT)
Dept: FAMILY MEDICINE CLINIC | Facility: CLINIC | Age: 45
End: 2025-07-28
Payer: COMMERCIAL

## 2025-07-28 VITALS
HEIGHT: 64 IN | DIASTOLIC BLOOD PRESSURE: 69 MMHG | OXYGEN SATURATION: 98 % | SYSTOLIC BLOOD PRESSURE: 106 MMHG | WEIGHT: 204 LBS | BODY MASS INDEX: 34.83 KG/M2 | HEART RATE: 75 BPM

## 2025-07-28 DIAGNOSIS — Z13.220 LIPID SCREENING: ICD-10-CM

## 2025-07-28 DIAGNOSIS — L81.9 DISCOLORATION OF SKIN OF FACE: ICD-10-CM

## 2025-07-28 DIAGNOSIS — L73.2 HIDRADENITIS SUPPURATIVA: ICD-10-CM

## 2025-07-28 DIAGNOSIS — Z00.00 ANNUAL PHYSICAL EXAM: Primary | ICD-10-CM

## 2025-07-28 DIAGNOSIS — Z12.31 BREAST CANCER SCREENING BY MAMMOGRAM: ICD-10-CM

## 2025-07-28 DIAGNOSIS — G47.419 NARCOLEPSY WITHOUT CATAPLEXY: ICD-10-CM

## 2025-07-28 DIAGNOSIS — G43.809 OTHER MIGRAINE WITHOUT STATUS MIGRAINOSUS, NOT INTRACTABLE: ICD-10-CM

## 2025-07-28 DIAGNOSIS — Z12.11 SCREENING FOR COLON CANCER: ICD-10-CM

## 2025-07-28 DIAGNOSIS — Z13.29 THYROID DISORDER SCREEN: ICD-10-CM

## 2025-07-28 PROCEDURE — 1160F RVW MEDS BY RX/DR IN RCRD: CPT | Performed by: NURSE PRACTITIONER

## 2025-07-28 PROCEDURE — 1159F MED LIST DOCD IN RCRD: CPT | Performed by: NURSE PRACTITIONER

## 2025-07-28 PROCEDURE — 1125F AMNT PAIN NOTED PAIN PRSNT: CPT | Performed by: NURSE PRACTITIONER

## 2025-07-28 PROCEDURE — 99396 PREV VISIT EST AGE 40-64: CPT | Performed by: NURSE PRACTITIONER

## 2025-07-28 RX ORDER — DEXTROAMPHETAMINE SACCHARATE, AMPHETAMINE ASPARTATE, DEXTROAMPHETAMINE SULFATE AND AMPHETAMINE SULFATE 5; 5; 5; 5 MG/1; MG/1; MG/1; MG/1
20 TABLET ORAL 2 TIMES DAILY
Qty: 60 TABLET | Refills: 0 | Status: SHIPPED | OUTPATIENT
Start: 2025-07-28

## 2025-07-28 RX ORDER — TOPIRAMATE 50 MG/1
50 TABLET, FILM COATED ORAL 2 TIMES DAILY
Qty: 180 TABLET | Refills: 1 | Status: SHIPPED | OUTPATIENT
Start: 2025-07-28

## 2025-07-28 RX ORDER — SPIRONOLACTONE 50 MG/1
50 TABLET, FILM COATED ORAL DAILY
Qty: 90 TABLET | Refills: 1 | Status: SHIPPED | OUTPATIENT
Start: 2025-07-28

## 2025-07-28 NOTE — PROGRESS NOTES
Chief Complaint  Hidradenitis suppurativa and Migraine, Annual exam     SUBJECTIVE  Sandra Dhillon presents to [facility] for annual exam, six month follow up on Hidradenitis suppurativa and Migraine.  Patient states she lost her job a Few months ago and had a change of insurance due to this, states that she has been off of the Cosentyx for her at bedtime since that time, states that she got a call from dermatology a couple days ago who told her that she needed to schedule a follow-up because they would need to see her in person to run a new prior authorization.    Pt states she moved recently and misplaced the Cologuard kit and is requesting a new order.     Patient complaining of darkness on the sides of her face, would like us to take a look    History of Present Illness  Past Medical History:   Diagnosis Date    Anemia     Anxiety     Arthritis     Asthma     Depression     Foot pain, right     Forgetfulness     Heel pain     Hemorrhoids     Hidradenitis suppurativa     Ingrown toenail     Migraines     Numbness in feet       Family History   Problem Relation Age of Onset    Cancer Mother         Unspecified    Arthritis Mother     Diabetes Mother     Cancer Maternal Grandfather         Thyroid pancreatic    Diabetes Other     Diabetes Paternal Grandmother     Cancer Paternal Grandfather       Past Surgical History:   Procedure Laterality Date    D & C CERVICAL BIOPSY      OTHER SURGICAL HISTORY      Arm surgery    OTHER SURGICAL HISTORY      Excision, sweat glands, inguinal region    TUBAL ABDOMINAL LIGATION      Tube removed due to ectopic pregnancy        Current Outpatient Medications:     amphetamine-dextroamphetamine (ADDERALL) 20 MG tablet, Take 1 tablet by mouth 2 (Two) Times a Day., Disp: 60 tablet, Rfl: 0    clindamycin (CLEOCIN T) 1 % lotion, As Needed., Disp: , Rfl:     Cosentyx UnoReady 300 MG/2ML solution auto-injector, , Disp: , Rfl:     escitalopram (LEXAPRO) 20 MG  "tablet, 1 tablet., Disp: , Rfl:     ibuprofen (ADVIL,MOTRIN) 800 MG tablet, TAKE 1 TABLET BY MOUTH EVERY 8 HOURS AS NEEDED FOR MODERATE PAIN, Disp: 30 tablet, Rfl: 1    metFORMIN (GLUCOPHAGE) 500 MG tablet, Take 1 tablet by mouth 2 (Two) Times a Day With Meals., Disp: 180 tablet, Rfl: 1    spironolactone (ALDACTONE) 50 MG tablet, Take 1 tablet by mouth Daily., Disp: 90 tablet, Rfl: 1    topiramate (Topamax) 50 MG tablet, Take 1 tablet by mouth 2 (Two) Times a Day., Disp: 180 tablet, Rfl: 1    OBJECTIVE  Vital Signs:   /69   Pulse 75   Ht 161.3 cm (63.5\")   Wt 92.5 kg (204 lb)   SpO2 98%   BMI 35.57 kg/m²    Estimated body mass index is 35.57 kg/m² as calculated from the following:    Height as of this encounter: 161.3 cm (63.5\").    Weight as of this encounter: 92.5 kg (204 lb).     Wt Readings from Last 3 Encounters:   07/28/25 92.5 kg (204 lb)   07/02/25 91 kg (200 lb 9.6 oz)   03/24/25 97.4 kg (214 lb 12.8 oz)     BP Readings from Last 3 Encounters:   07/28/25 106/69   07/02/25 122/85   03/24/25 132/85       Physical Exam  Vitals reviewed.   Constitutional:       General: She is not in acute distress.     Appearance: Normal appearance. She is well-developed. She is not diaphoretic.   HENT:      Head: Normocephalic and atraumatic. Hair is normal.      Right Ear: Hearing, tympanic membrane, ear canal and external ear normal. No decreased hearing noted. No drainage.      Left Ear: Hearing, tympanic membrane, ear canal and external ear normal. No decreased hearing noted.      Nose: Nose normal. No nasal deformity.      Mouth/Throat:      Mouth: Mucous membranes are moist.   Eyes:      General: Lids are normal.         Right eye: No discharge.         Left eye: No discharge.      Extraocular Movements: Extraocular movements intact.      Conjunctiva/sclera: Conjunctivae normal.      Pupils: Pupils are equal, round, and reactive to light.   Neck:      Thyroid: No thyromegaly.      Vascular: No JVD. "   Cardiovascular:      Rate and Rhythm: Normal rate and regular rhythm.      Pulses: Normal pulses.      Heart sounds: Normal heart sounds. No murmur heard.     No friction rub. No gallop.   Pulmonary:      Effort: Pulmonary effort is normal. No respiratory distress.      Breath sounds: Normal breath sounds. No wheezing or rales.   Chest:      Chest wall: No tenderness.   Abdominal:      General: Bowel sounds are normal. There is no distension.      Palpations: Abdomen is soft. There is no mass.      Tenderness: There is no abdominal tenderness. There is no guarding or rebound.      Hernia: No hernia is present.   Musculoskeletal:         General: No tenderness or deformity. Normal range of motion.      Cervical back: Normal range of motion and neck supple.   Lymphadenopathy:      Cervical: No cervical adenopathy.   Skin:     General: Skin is warm and dry.      Findings: No erythema or rash.      Comments: Darker/discolored patches of skin noted to bilateral sides of face, left greater than right   Neurological:      Mental Status: She is alert and oriented to person, place, and time.      Cranial Nerves: No cranial nerve deficit.      Motor: No abnormal muscle tone.      Coordination: Coordination normal.      Deep Tendon Reflexes: Reflexes are normal and symmetric. Reflexes normal.   Psychiatric:         Mood and Affect: Mood normal.         Behavior: Behavior normal.         Thought Content: Thought content normal.         Judgment: Judgment normal.          Result Review        No Images in the past 120 days found..     The above data has been reviewed by MEME Child 07/28/2025 14:14 EDT.          Patient Care Team:  Veronica Liang APRN as PCP - General (Nurse Practitioner)  Kala Jacome MD as Consulting Physician (General Surgery)       ASSESSMENT & PLAN    Diagnoses and all orders for this visit:    1. Annual physical exam (Primary)  -     Mammo Screening Digital Tomosynthesis  Bilateral With CAD; Future  -     Comprehensive Metabolic Panel; Future  -     CBC & Differential; Future  -     Lipid Panel; Future  -     TSH Rfx On Abnormal To Free T4; Future    2. Screening for colon cancer  -     Cologuard - Stool, Per Rectum; Future    3. Hidradenitis suppurativa  Assessment & Plan:  Patient previously taking Cosentyx but lost insurance coverage, received phone call from dermatology 2 days ago informing her to schedule follow-up so that they could run prior Auth to get her started back on it, patient will continue spironolactone pending dermatology follow-up    Orders:  -     metFORMIN (GLUCOPHAGE) 500 MG tablet; Take 1 tablet by mouth 2 (Two) Times a Day With Meals.  Dispense: 180 tablet; Refill: 1  -     spironolactone (ALDACTONE) 50 MG tablet; Take 1 tablet by mouth Daily.  Dispense: 90 tablet; Refill: 1    4. Other migraine without status migrainosus, not intractable  Overview:  Stable and well-controlled Topamax, continue current dose    Orders:  -     topiramate (Topamax) 50 MG tablet; Take 1 tablet by mouth 2 (Two) Times a Day.  Dispense: 180 tablet; Refill: 1    5. Lipid screening  -     Lipid Panel; Future    6. Thyroid disorder screen  -     TSH Rfx On Abnormal To Free T4; Future    7. Breast cancer screening by mammogram  -     Mammo Screening Digital Tomosynthesis Bilateral With CAD; Future    8. Discoloration of skin of face  Comments:  Discussed suspected melasma, somewhat asymmetrical at present, recommend scheduled appointment with Derm, stressed importance of sunscreen    Discussed that spironolactone could potentially contribute to this, patient will follow-up with dermatology, established patient, does not need referral    Tobacco Use: High Risk (7/28/2025)    Patient History     Smoking Tobacco Use: Every Day     Smokeless Tobacco Use: Never     Passive Exposure: Current     The patient is advised to attempt to lose weight, continue current medications, and return for  routine annual checkups.    Follow Up     Return if symptoms worsen or fail to improve.        Patient was given instructions and counseling regarding her condition or for health maintenance advice. Please see specific information pulled into the AVS if appropriate.   I have reviewed information obtained and documented by others and I have confirmed the accuracy of this documented note.    MEME Child

## 2025-07-28 NOTE — ASSESSMENT & PLAN NOTE
Patient previously taking Cosentyx but lost insurance coverage, received phone call from dermatology 2 days ago informing her to schedule follow-up so that they could run prior Auth to get her started back on it, patient will continue spironolactone pending dermatology follow-up

## 2025-08-21 ENCOUNTER — HOSPITAL ENCOUNTER (OUTPATIENT)
Dept: MAMMOGRAPHY | Facility: HOSPITAL | Age: 45
Discharge: HOME | End: 2025-08-21
Admitting: NURSE PRACTITIONER
Payer: COMMERCIAL

## 2025-08-21 DIAGNOSIS — Z00.00 ANNUAL PHYSICAL EXAM: ICD-10-CM

## 2025-08-21 DIAGNOSIS — Z12.31 BREAST CANCER SCREENING BY MAMMOGRAM: ICD-10-CM

## 2025-08-21 PROCEDURE — 77063 BREAST TOMOSYNTHESIS BI: CPT

## 2025-08-21 PROCEDURE — 77067 SCR MAMMO BI INCL CAD: CPT
